# Patient Record
Sex: MALE | Race: BLACK OR AFRICAN AMERICAN | Employment: OTHER | RURAL
[De-identification: names, ages, dates, MRNs, and addresses within clinical notes are randomized per-mention and may not be internally consistent; named-entity substitution may affect disease eponyms.]

---

## 2018-12-03 PROBLEM — Z72.0 TOBACCO USE: Chronic | Status: ACTIVE | Noted: 2018-12-03

## 2018-12-03 PROBLEM — I10 ESSENTIAL HYPERTENSION: Chronic | Status: ACTIVE | Noted: 2018-12-03

## 2018-12-03 PROBLEM — Z91.89 POOR DENTAL HYGIENE: Status: ACTIVE | Noted: 2018-12-03

## 2021-01-01 ENCOUNTER — HOSPITAL ENCOUNTER (INPATIENT)
Age: 71
LOS: 1 days | DRG: 871 | End: 2021-08-31
Attending: FAMILY MEDICINE | Admitting: INTERNAL MEDICINE
Payer: MEDICARE

## 2021-01-01 ENCOUNTER — APPOINTMENT (OUTPATIENT)
Dept: CT IMAGING | Age: 71
End: 2021-01-01
Attending: EMERGENCY MEDICINE
Payer: MEDICARE

## 2021-01-01 ENCOUNTER — APPOINTMENT (OUTPATIENT)
Dept: GENERAL RADIOLOGY | Age: 71
DRG: 871 | End: 2021-01-01
Attending: INTERNAL MEDICINE
Payer: MEDICARE

## 2021-01-01 ENCOUNTER — APPOINTMENT (OUTPATIENT)
Dept: CT IMAGING | Age: 71
DRG: 871 | End: 2021-01-01
Attending: FAMILY MEDICINE
Payer: MEDICARE

## 2021-01-01 ENCOUNTER — APPOINTMENT (OUTPATIENT)
Dept: GENERAL RADIOLOGY | Age: 71
DRG: 871 | End: 2021-01-01
Attending: FAMILY MEDICINE
Payer: MEDICARE

## 2021-01-01 ENCOUNTER — APPOINTMENT (OUTPATIENT)
Dept: GENERAL RADIOLOGY | Age: 71
End: 2021-01-01
Attending: EMERGENCY MEDICINE
Payer: MEDICARE

## 2021-01-01 ENCOUNTER — HOSPITAL ENCOUNTER (EMERGENCY)
Age: 71
Discharge: HOME | End: 2021-08-17
Attending: EMERGENCY MEDICINE
Payer: MEDICARE

## 2021-01-01 VITALS
DIASTOLIC BLOOD PRESSURE: 56 MMHG | RESPIRATION RATE: 20 BRPM | HEART RATE: 59 BPM | TEMPERATURE: 95.2 F | OXYGEN SATURATION: 98 % | WEIGHT: 96.3 LBS | BODY MASS INDEX: 15.11 KG/M2 | SYSTOLIC BLOOD PRESSURE: 113 MMHG | HEIGHT: 67 IN

## 2021-01-01 VITALS
HEIGHT: 68 IN | HEART RATE: 89 BPM | BODY MASS INDEX: 15.91 KG/M2 | WEIGHT: 105 LBS | SYSTOLIC BLOOD PRESSURE: 137 MMHG | RESPIRATION RATE: 16 BRPM | DIASTOLIC BLOOD PRESSURE: 108 MMHG | TEMPERATURE: 97.8 F | OXYGEN SATURATION: 100 %

## 2021-01-01 DIAGNOSIS — D64.9 ANEMIA, UNSPECIFIED TYPE: ICD-10-CM

## 2021-01-01 DIAGNOSIS — R64 CACHEXIA (HCC): ICD-10-CM

## 2021-01-01 DIAGNOSIS — R41.82 ALTERED MENTAL STATUS, UNSPECIFIED ALTERED MENTAL STATUS TYPE: Primary | ICD-10-CM

## 2021-01-01 DIAGNOSIS — R63.4 WEIGHT LOSS: ICD-10-CM

## 2021-01-01 DIAGNOSIS — E87.20 LACTIC ACIDOSIS: ICD-10-CM

## 2021-01-01 DIAGNOSIS — E86.0 DEHYDRATION: ICD-10-CM

## 2021-01-01 DIAGNOSIS — S32.001A CLOSED BURST FRACTURE OF LUMBAR VERTEBRA, INITIAL ENCOUNTER (HCC): Primary | ICD-10-CM

## 2021-01-01 LAB
ALBUMIN SERPL-MCNC: 2.1 G/DL (ref 3.5–5)
ALBUMIN SERPL-MCNC: 2.4 G/DL (ref 3.5–5)
ALBUMIN SERPL-MCNC: 2.6 G/DL (ref 3.5–5)
ALBUMIN SERPL-MCNC: 3.1 G/DL (ref 3.5–5)
ALBUMIN/GLOB SERPL: 0.6 {RATIO} (ref 1.1–2.2)
ALBUMIN/GLOB SERPL: 0.6 {RATIO} (ref 1.1–2.2)
ALBUMIN/GLOB SERPL: 0.7 {RATIO} (ref 1.1–2.2)
ALBUMIN/GLOB SERPL: 0.8 {RATIO} (ref 1.1–2.2)
ALP SERPL-CCNC: 37 U/L (ref 45–117)
ALP SERPL-CCNC: 42 U/L (ref 45–117)
ALP SERPL-CCNC: 43 U/L (ref 45–117)
ALP SERPL-CCNC: 55 U/L (ref 45–117)
ALT SERPL-CCNC: 13 U/L (ref 12–78)
ALT SERPL-CCNC: 13 U/L (ref 12–78)
ALT SERPL-CCNC: 14 U/L (ref 12–78)
ALT SERPL-CCNC: 17 U/L (ref 12–78)
AMMONIA PLAS-SCNC: 13 UMOL/L
AMPHET UR QL SCN: NEGATIVE
ANION GAP SERPL CALC-SCNC: 13 MMOL/L (ref 5–15)
ANION GAP SERPL CALC-SCNC: 17 MMOL/L (ref 5–15)
ANION GAP SERPL CALC-SCNC: 18 MMOL/L (ref 5–15)
ANION GAP SERPL CALC-SCNC: 19 MMOL/L (ref 5–15)
ANION GAP SERPL CALC-SCNC: 19 MMOL/L (ref 5–15)
APPEARANCE UR: CLEAR
ARTERIAL PATENCY WRIST A: YES
AST SERPL-CCNC: 12 U/L (ref 15–37)
AST SERPL-CCNC: 17 U/L (ref 15–37)
ATRIAL RATE: 80 BPM
BACTERIA URNS QL MICRO: NEGATIVE /HPF
BARBITURATES UR QL SCN: NEGATIVE
BASE DEFICIT BLDA-SCNC: 2.9 MMOL/L
BASOPHILS # BLD: 0 K/UL (ref 0–0.1)
BASOPHILS # BLD: 0.1 K/UL (ref 0–0.1)
BASOPHILS NFR BLD: 0 % (ref 0–1)
BASOPHILS NFR BLD: 1 % (ref 0–1)
BDY SITE: ABNORMAL
BENZODIAZ UR QL: NEGATIVE
BILIRUB SERPL-MCNC: 1.2 MG/DL (ref 0.2–1)
BILIRUB SERPL-MCNC: 1.3 MG/DL (ref 0.2–1)
BILIRUB SERPL-MCNC: 1.7 MG/DL (ref 0.2–1)
BILIRUB SERPL-MCNC: 1.8 MG/DL (ref 0.2–1)
BILIRUB UR QL CFM: NEGATIVE
BUN SERPL-MCNC: 20 MG/DL (ref 6–20)
BUN SERPL-MCNC: 38 MG/DL (ref 6–20)
BUN SERPL-MCNC: 40 MG/DL (ref 6–20)
BUN SERPL-MCNC: 42 MG/DL (ref 6–20)
BUN SERPL-MCNC: 44 MG/DL (ref 6–20)
BUN/CREAT SERPL: 19 (ref 12–20)
BUN/CREAT SERPL: 32 (ref 12–20)
BUN/CREAT SERPL: 33 (ref 12–20)
BUN/CREAT SERPL: 35 (ref 12–20)
BUN/CREAT SERPL: 38 (ref 12–20)
CALCIUM SERPL-MCNC: 10.1 MG/DL (ref 8.5–10.1)
CALCIUM SERPL-MCNC: 7.8 MG/DL (ref 8.5–10.1)
CALCIUM SERPL-MCNC: 7.9 MG/DL (ref 8.5–10.1)
CALCIUM SERPL-MCNC: 8.1 MG/DL (ref 8.5–10.1)
CALCIUM SERPL-MCNC: 8.9 MG/DL (ref 8.5–10.1)
CALCULATED P AXIS, ECG09: 68 DEGREES
CALCULATED R AXIS, ECG10: 78 DEGREES
CALCULATED T AXIS, ECG11: 18 DEGREES
CANNABINOIDS UR QL SCN: NEGATIVE
CHLORIDE SERPL-SCNC: 111 MMOL/L (ref 97–108)
CHLORIDE SERPL-SCNC: 116 MMOL/L (ref 97–108)
CHLORIDE SERPL-SCNC: 118 MMOL/L (ref 97–108)
CHLORIDE SERPL-SCNC: 119 MMOL/L (ref 97–108)
CHLORIDE SERPL-SCNC: 98 MMOL/L (ref 97–108)
CK SERPL-CCNC: 81 U/L (ref 39–308)
CO2 SERPL-SCNC: 15 MMOL/L (ref 21–32)
CO2 SERPL-SCNC: 16 MMOL/L (ref 21–32)
CO2 SERPL-SCNC: 17 MMOL/L (ref 21–32)
CO2 SERPL-SCNC: 17 MMOL/L (ref 21–32)
CO2 SERPL-SCNC: 24 MMOL/L (ref 21–32)
COCAINE UR QL SCN: NEGATIVE
COLOR UR: ABNORMAL
CREAT SERPL-MCNC: 1.07 MG/DL (ref 0.7–1.3)
CREAT SERPL-MCNC: 1.15 MG/DL (ref 0.7–1.3)
CREAT SERPL-MCNC: 1.3 MG/DL (ref 0.7–1.3)
DIAGNOSIS, 93000: NORMAL
DIFFERENTIAL METHOD BLD: ABNORMAL
DRUG SCRN COMMENT,DRGCM: NORMAL
EOSINOPHIL # BLD: 0 K/UL (ref 0–0.4)
EOSINOPHIL # BLD: 0.1 K/UL (ref 0–0.4)
EOSINOPHIL NFR BLD: 0 % (ref 0–7)
EOSINOPHIL NFR BLD: 1 % (ref 0–7)
EPITH CASTS URNS QL MICRO: ABNORMAL /LPF
ERYTHROCYTE [DISTWIDTH] IN BLOOD BY AUTOMATED COUNT: 13.7 % (ref 11.5–14.5)
ERYTHROCYTE [DISTWIDTH] IN BLOOD BY AUTOMATED COUNT: 14.2 % (ref 11.5–14.5)
ERYTHROCYTE [DISTWIDTH] IN BLOOD BY AUTOMATED COUNT: 14.4 % (ref 11.5–14.5)
ERYTHROCYTE [DISTWIDTH] IN BLOOD BY AUTOMATED COUNT: 14.6 % (ref 11.5–14.5)
ETHANOL SERPL-MCNC: <10 MG/DL
ETHANOL SERPL-MCNC: <10 MG/DL
FLUAV RNA SPEC QL NAA+PROBE: NOT DETECTED
FLUBV RNA SPEC QL NAA+PROBE: NOT DETECTED
GLOBULIN SER CALC-MCNC: 3.2 G/DL (ref 2–4)
GLOBULIN SER CALC-MCNC: 3.4 G/DL (ref 2–4)
GLOBULIN SER CALC-MCNC: 4 G/DL (ref 2–4)
GLOBULIN SER CALC-MCNC: 4.9 G/DL (ref 2–4)
GLUCOSE SERPL-MCNC: 104 MG/DL (ref 65–100)
GLUCOSE SERPL-MCNC: 117 MG/DL (ref 65–100)
GLUCOSE SERPL-MCNC: 88 MG/DL (ref 65–100)
GLUCOSE SERPL-MCNC: 99 MG/DL (ref 65–100)
GLUCOSE SERPL-MCNC: 99 MG/DL (ref 65–100)
GLUCOSE UR STRIP.AUTO-MCNC: NEGATIVE MG/DL
HCO3 BLDA-SCNC: 19 MMOL/L (ref 22–26)
HCT VFR BLD AUTO: 29.6 % (ref 36.6–50.3)
HCT VFR BLD AUTO: 32.1 % (ref 36.6–50.3)
HCT VFR BLD AUTO: 32.4 % (ref 36.6–50.3)
HCT VFR BLD AUTO: 48.2 % (ref 36.6–50.3)
HEMOCCULT STL QL: NEGATIVE
HGB BLD-MCNC: 10.5 G/DL (ref 12.1–17)
HGB BLD-MCNC: 10.8 G/DL (ref 12.1–17)
HGB BLD-MCNC: 15.8 G/DL (ref 12.1–17)
HGB BLD-MCNC: 9.1 G/DL (ref 12.1–17)
HGB BLD-MCNC: 9.6 G/DL (ref 12.1–17)
HGB UR QL STRIP: ABNORMAL
IMM GRANULOCYTES # BLD AUTO: 0 K/UL (ref 0–0.04)
IMM GRANULOCYTES # BLD AUTO: 0.1 K/UL (ref 0–0.04)
IMM GRANULOCYTES NFR BLD AUTO: 1 % (ref 0–0.5)
IMM GRANULOCYTES NFR BLD AUTO: 2 % (ref 0–0.5)
KETONES UR QL STRIP.AUTO: ABNORMAL MG/DL
LACTATE SERPL-SCNC: 2.2 MMOL/L (ref 0.4–2)
LACTATE SERPL-SCNC: 2.5 MMOL/L (ref 0.4–2)
LACTATE SERPL-SCNC: 3 MMOL/L (ref 0.4–2)
LACTATE SERPL-SCNC: 3.4 MMOL/L (ref 0.4–2)
LACTATE SERPL-SCNC: 4.6 MMOL/L (ref 0.4–2)
LACTATE SERPL-SCNC: 5.2 MMOL/L (ref 0.4–2)
LEUKOCYTE ESTERASE UR QL STRIP.AUTO: ABNORMAL
LYMPHOCYTES # BLD: 1.1 K/UL (ref 0.8–3.5)
LYMPHOCYTES # BLD: 1.2 K/UL (ref 0.8–3.5)
LYMPHOCYTES # BLD: 1.3 K/UL (ref 0.8–3.5)
LYMPHOCYTES # BLD: 2.3 K/UL (ref 0.8–3.5)
LYMPHOCYTES NFR BLD: 15 % (ref 12–49)
LYMPHOCYTES NFR BLD: 16 % (ref 12–49)
LYMPHOCYTES NFR BLD: 19 % (ref 12–49)
LYMPHOCYTES NFR BLD: 29 % (ref 12–49)
MAGNESIUM SERPL-MCNC: 1.8 MG/DL (ref 1.6–2.4)
MAGNESIUM SERPL-MCNC: 2.1 MG/DL (ref 1.6–2.4)
MCH RBC QN AUTO: 29.3 PG (ref 26–34)
MCH RBC QN AUTO: 29.4 PG (ref 26–34)
MCH RBC QN AUTO: 29.6 PG (ref 26–34)
MCH RBC QN AUTO: 29.9 PG (ref 26–34)
MCHC RBC AUTO-ENTMCNC: 32.4 G/DL (ref 30–36.5)
MCHC RBC AUTO-ENTMCNC: 32.7 G/DL (ref 30–36.5)
MCHC RBC AUTO-ENTMCNC: 32.8 G/DL (ref 30–36.5)
MCHC RBC AUTO-ENTMCNC: 33.3 G/DL (ref 30–36.5)
MCV RBC AUTO: 88.8 FL (ref 80–99)
MCV RBC AUTO: 89.7 FL (ref 80–99)
MCV RBC AUTO: 90.5 FL (ref 80–99)
MCV RBC AUTO: 91.1 FL (ref 80–99)
METHADONE UR QL: NEGATIVE
MONOCYTES # BLD: 0.3 K/UL (ref 0–1)
MONOCYTES # BLD: 0.3 K/UL (ref 0–1)
MONOCYTES # BLD: 0.4 K/UL (ref 0–1)
MONOCYTES # BLD: 0.5 K/UL (ref 0–1)
MONOCYTES NFR BLD: 4 % (ref 5–13)
MONOCYTES NFR BLD: 5 % (ref 5–13)
MONOCYTES NFR BLD: 5 % (ref 5–13)
MONOCYTES NFR BLD: 6 % (ref 5–13)
NEUTS SEG # BLD: 4.9 K/UL (ref 1.8–8)
NEUTS SEG # BLD: 5 K/UL (ref 1.8–8)
NEUTS SEG # BLD: 5.8 K/UL (ref 1.8–8)
NEUTS SEG # BLD: 6 K/UL (ref 1.8–8)
NEUTS SEG NFR BLD: 62 % (ref 32–75)
NEUTS SEG NFR BLD: 75 % (ref 32–75)
NEUTS SEG NFR BLD: 78 % (ref 32–75)
NEUTS SEG NFR BLD: 79 % (ref 32–75)
NITRITE UR QL STRIP.AUTO: NEGATIVE
NRBC # BLD: 0 K/UL (ref 0–0.01)
NRBC # BLD: 0.11 K/UL (ref 0–0.01)
NRBC # BLD: 0.15 K/UL (ref 0–0.01)
NRBC # BLD: 0.18 K/UL (ref 0–0.01)
NRBC BLD-RTO: 0 PER 100 WBC
NRBC BLD-RTO: 1.6 PER 100 WBC
NRBC BLD-RTO: 2.1 PER 100 WBC
NRBC BLD-RTO: 2.3 PER 100 WBC
OPIATES UR QL: NEGATIVE
P-R INTERVAL, ECG05: 112 MS
PCO2 BLDA: 27 MMHG (ref 35–45)
PCP UR QL: NEGATIVE
PH BLDA: 7.47 [PH] (ref 7.35–7.45)
PH UR STRIP: 5.5 [PH] (ref 5–8)
PLATELET # BLD AUTO: 104 K/UL (ref 150–400)
PLATELET # BLD AUTO: 139 K/UL (ref 150–400)
PLATELET # BLD AUTO: 162 K/UL (ref 150–400)
PLATELET # BLD AUTO: 196 K/UL (ref 150–400)
PMV BLD AUTO: 10.4 FL (ref 8.9–12.9)
PMV BLD AUTO: 10.6 FL (ref 8.9–12.9)
PMV BLD AUTO: 10.7 FL (ref 8.9–12.9)
PMV BLD AUTO: 10.9 FL (ref 8.9–12.9)
PO2 BLDA: 129 MMHG (ref 80–100)
POTASSIUM SERPL-SCNC: 3.1 MMOL/L (ref 3.5–5.1)
POTASSIUM SERPL-SCNC: 3.4 MMOL/L (ref 3.5–5.1)
POTASSIUM SERPL-SCNC: 3.9 MMOL/L (ref 3.5–5.1)
POTASSIUM SERPL-SCNC: 4.1 MMOL/L (ref 3.5–5.1)
POTASSIUM SERPL-SCNC: 4.1 MMOL/L (ref 3.5–5.1)
PROT SERPL-MCNC: 5.5 G/DL (ref 6.4–8.2)
PROT SERPL-MCNC: 5.6 G/DL (ref 6.4–8.2)
PROT SERPL-MCNC: 6.6 G/DL (ref 6.4–8.2)
PROT SERPL-MCNC: 8 G/DL (ref 6.4–8.2)
PROT UR STRIP-MCNC: NEGATIVE MG/DL
Q-T INTERVAL, ECG07: 432 MS
QRS DURATION, ECG06: 84 MS
QTC CALCULATION (BEZET), ECG08: 498 MS
RBC # BLD AUTO: 3.27 M/UL (ref 4.1–5.7)
RBC # BLD AUTO: 3.58 M/UL (ref 4.1–5.7)
RBC # BLD AUTO: 3.65 M/UL (ref 4.1–5.7)
RBC # BLD AUTO: 5.29 M/UL (ref 4.1–5.7)
RBC #/AREA URNS HPF: ABNORMAL /HPF (ref 0–5)
SAO2 % BLD: 99 % (ref 92–97)
SAO2% DEVICE SAO2% SENSOR NAME: ABNORMAL
SARS-COV-2, COV2: NOT DETECTED
SODIUM SERPL-SCNC: 135 MMOL/L (ref 136–145)
SODIUM SERPL-SCNC: 147 MMOL/L (ref 136–145)
SODIUM SERPL-SCNC: 151 MMOL/L (ref 136–145)
SODIUM SERPL-SCNC: 152 MMOL/L (ref 136–145)
SODIUM SERPL-SCNC: 152 MMOL/L (ref 136–145)
SP GR UR REFRACTOMETRY: 1.02 (ref 1–1.03)
SPECIMEN SITE: ABNORMAL
TROPONIN I SERPL-MCNC: <0.05 NG/ML
TSH SERPL DL<=0.05 MIU/L-ACNC: 1.56 UIU/ML (ref 0.36–3.74)
UA: UC IF INDICATED,UAUC: ABNORMAL
UROBILINOGEN UR QL STRIP.AUTO: 1 EU/DL (ref 0.2–1)
VANCOMYCIN SERPL-MCNC: 19 UG/ML
VENTRICULAR RATE, ECG03: 80 BPM
VIT B12 SERPL-MCNC: 600 PG/ML (ref 193–986)
WBC # BLD AUTO: 6.7 K/UL (ref 4.1–11.1)
WBC # BLD AUTO: 7.3 K/UL (ref 4.1–11.1)
WBC # BLD AUTO: 7.7 K/UL (ref 4.1–11.1)
WBC # BLD AUTO: 7.9 K/UL (ref 4.1–11.1)
WBC URNS QL MICRO: ABNORMAL /HPF (ref 0–4)

## 2021-01-01 PROCEDURE — 74011250636 HC RX REV CODE- 250/636: Performed by: INTERNAL MEDICINE

## 2021-01-01 PROCEDURE — 85025 COMPLETE CBC W/AUTO DIFF WBC: CPT

## 2021-01-01 PROCEDURE — 85018 HEMOGLOBIN: CPT

## 2021-01-01 PROCEDURE — 74011000250 HC RX REV CODE- 250: Performed by: INTERNAL MEDICINE

## 2021-01-01 PROCEDURE — 80053 COMPREHEN METABOLIC PANEL: CPT

## 2021-01-01 PROCEDURE — 74011000258 HC RX REV CODE- 258: Performed by: FAMILY MEDICINE

## 2021-01-01 PROCEDURE — 96360 HYDRATION IV INFUSION INIT: CPT

## 2021-01-01 PROCEDURE — 99283 EMERGENCY DEPT VISIT LOW MDM: CPT

## 2021-01-01 PROCEDURE — 82077 ASSAY SPEC XCP UR&BREATH IA: CPT

## 2021-01-01 PROCEDURE — 82803 BLOOD GASES ANY COMBINATION: CPT

## 2021-01-01 PROCEDURE — 36415 COLL VENOUS BLD VENIPUNCTURE: CPT

## 2021-01-01 PROCEDURE — 80307 DRUG TEST PRSMV CHEM ANLYZR: CPT

## 2021-01-01 PROCEDURE — 96365 THER/PROPH/DIAG IV INF INIT: CPT

## 2021-01-01 PROCEDURE — 82140 ASSAY OF AMMONIA: CPT

## 2021-01-01 PROCEDURE — 93005 ELECTROCARDIOGRAM TRACING: CPT

## 2021-01-01 PROCEDURE — 99218 HC RM OBSERVATION: CPT

## 2021-01-01 PROCEDURE — 99284 EMERGENCY DEPT VISIT MOD MDM: CPT

## 2021-01-01 PROCEDURE — C9113 INJ PANTOPRAZOLE SODIUM, VIA: HCPCS | Performed by: INTERNAL MEDICINE

## 2021-01-01 PROCEDURE — 70450 CT HEAD/BRAIN W/O DYE: CPT

## 2021-01-01 PROCEDURE — 96376 TX/PRO/DX INJ SAME DRUG ADON: CPT

## 2021-01-01 PROCEDURE — 74011000258 HC RX REV CODE- 258: Performed by: INTERNAL MEDICINE

## 2021-01-01 PROCEDURE — 71045 X-RAY EXAM CHEST 1 VIEW: CPT

## 2021-01-01 PROCEDURE — 65270000029 HC RM PRIVATE

## 2021-01-01 PROCEDURE — 94760 N-INVAS EAR/PLS OXIMETRY 1: CPT

## 2021-01-01 PROCEDURE — 84484 ASSAY OF TROPONIN QUANT: CPT

## 2021-01-01 PROCEDURE — 87040 BLOOD CULTURE FOR BACTERIA: CPT

## 2021-01-01 PROCEDURE — 83605 ASSAY OF LACTIC ACID: CPT

## 2021-01-01 PROCEDURE — 73502 X-RAY EXAM HIP UNI 2-3 VIEWS: CPT

## 2021-01-01 PROCEDURE — 36600 WITHDRAWAL OF ARTERIAL BLOOD: CPT

## 2021-01-01 PROCEDURE — 74011250636 HC RX REV CODE- 250/636: Performed by: FAMILY MEDICINE

## 2021-01-01 PROCEDURE — 51702 INSERT TEMP BLADDER CATH: CPT

## 2021-01-01 PROCEDURE — 74176 CT ABD & PELVIS W/O CONTRAST: CPT

## 2021-01-01 PROCEDURE — 74011250637 HC RX REV CODE- 250/637: Performed by: FAMILY MEDICINE

## 2021-01-01 PROCEDURE — 71046 X-RAY EXAM CHEST 2 VIEWS: CPT

## 2021-01-01 PROCEDURE — 82550 ASSAY OF CK (CPK): CPT

## 2021-01-01 PROCEDURE — 83735 ASSAY OF MAGNESIUM: CPT

## 2021-01-01 PROCEDURE — 71250 CT THORAX DX C-: CPT

## 2021-01-01 PROCEDURE — 82272 OCCULT BLD FECES 1-3 TESTS: CPT

## 2021-01-01 PROCEDURE — 80202 ASSAY OF VANCOMYCIN: CPT

## 2021-01-01 PROCEDURE — 84443 ASSAY THYROID STIM HORMONE: CPT

## 2021-01-01 PROCEDURE — 81001 URINALYSIS AUTO W/SCOPE: CPT

## 2021-01-01 PROCEDURE — 87636 SARSCOV2 & INF A&B AMP PRB: CPT

## 2021-01-01 PROCEDURE — 82607 VITAMIN B-12: CPT

## 2021-01-01 PROCEDURE — 96375 TX/PRO/DX INJ NEW DRUG ADDON: CPT

## 2021-01-01 RX ORDER — ACETAMINOPHEN 650 MG/1
650 SUPPOSITORY RECTAL
Status: DISCONTINUED | OUTPATIENT
Start: 2021-01-01 | End: 2021-01-01 | Stop reason: HOSPADM

## 2021-01-01 RX ORDER — ACETAMINOPHEN 500 MG
500 TABLET ORAL
Qty: 30 TABLET | Refills: 4 | Status: SHIPPED | OUTPATIENT
Start: 2021-01-01

## 2021-01-01 RX ORDER — POTASSIUM CHLORIDE AND SODIUM CHLORIDE 450; 150 MG/100ML; MG/100ML
INJECTION, SOLUTION INTRAVENOUS CONTINUOUS
Status: DISCONTINUED | OUTPATIENT
Start: 2021-01-01 | End: 2021-01-01

## 2021-01-01 RX ORDER — SODIUM CHLORIDE 0.9 % (FLUSH) 0.9 %
5-10 SYRINGE (ML) INJECTION AS NEEDED
Status: DISCONTINUED | OUTPATIENT
Start: 2021-01-01 | End: 2021-01-01 | Stop reason: HOSPADM

## 2021-01-01 RX ORDER — SODIUM CHLORIDE 9 MG/ML
100 INJECTION, SOLUTION INTRAVENOUS CONTINUOUS
Status: DISCONTINUED | OUTPATIENT
Start: 2021-01-01 | End: 2021-01-01

## 2021-01-01 RX ORDER — LEVOFLOXACIN 5 MG/ML
750 INJECTION, SOLUTION INTRAVENOUS
Status: DISCONTINUED | OUTPATIENT
Start: 2021-01-01 | End: 2021-01-01 | Stop reason: HOSPADM

## 2021-01-01 RX ORDER — DEXTROSE MONOHYDRATE 50 MG/ML
100 INJECTION, SOLUTION INTRAVENOUS CONTINUOUS
Status: DISCONTINUED | OUTPATIENT
Start: 2021-01-01 | End: 2021-01-01

## 2021-01-01 RX ORDER — SODIUM CHLORIDE AND POTASSIUM CHLORIDE .9; .15 G/100ML; G/100ML
SOLUTION INTRAVENOUS CONTINUOUS
Status: DISCONTINUED | OUTPATIENT
Start: 2021-01-01 | End: 2021-01-01

## 2021-01-01 RX ADMIN — SODIUM CHLORIDE 100 ML/HR: 9 INJECTION, SOLUTION INTRAVENOUS at 07:00

## 2021-01-01 RX ADMIN — SODIUM CHLORIDE 1000 ML: 9 INJECTION, SOLUTION INTRAVENOUS at 04:44

## 2021-01-01 RX ADMIN — POTASSIUM CHLORIDE AND SODIUM CHLORIDE: 900; 150 INJECTION, SOLUTION INTRAVENOUS at 10:36

## 2021-01-01 RX ADMIN — VANCOMYCIN HYDROCHLORIDE 500 MG: 500 INJECTION, POWDER, LYOPHILIZED, FOR SOLUTION INTRAVENOUS at 09:51

## 2021-01-01 RX ADMIN — CEFTRIAXONE SODIUM 1 G: 1 INJECTION, POWDER, FOR SOLUTION INTRAMUSCULAR; INTRAVENOUS at 00:55

## 2021-01-01 RX ADMIN — CEFEPIME HYDROCHLORIDE 2 G: 2 INJECTION, POWDER, FOR SOLUTION INTRAVENOUS at 09:14

## 2021-01-01 RX ADMIN — LEVOFLOXACIN 750 MG: 5 INJECTION, SOLUTION INTRAVENOUS at 10:31

## 2021-01-01 RX ADMIN — THIAMINE HYDROCHLORIDE 100 MG: 100 INJECTION, SOLUTION INTRAMUSCULAR; INTRAVENOUS at 14:39

## 2021-01-01 RX ADMIN — ACETAMINOPHEN 650 MG: 650 SUPPOSITORY RECTAL at 03:27

## 2021-01-01 RX ADMIN — PANTOPRAZOLE SODIUM 40 MG: 40 INJECTION, POWDER, FOR SOLUTION INTRAVENOUS at 09:10

## 2021-01-01 RX ADMIN — SODIUM CHLORIDE 1000 ML: 9 INJECTION, SOLUTION INTRAVENOUS at 06:15

## 2021-01-01 RX ADMIN — POTASSIUM CHLORIDE AND SODIUM CHLORIDE: 450; 150 INJECTION, SOLUTION INTRAVENOUS at 20:46

## 2021-01-01 RX ADMIN — SODIUM CHLORIDE 100 ML/HR: 9 INJECTION, SOLUTION INTRAVENOUS at 00:20

## 2021-01-01 RX ADMIN — CEFEPIME HYDROCHLORIDE 2 G: 2 INJECTION, POWDER, FOR SOLUTION INTRAVENOUS at 20:46

## 2021-01-01 RX ADMIN — SODIUM CHLORIDE 1000 ML: 9 INJECTION, SOLUTION INTRAVENOUS at 01:34

## 2021-01-01 RX ADMIN — CEFEPIME HYDROCHLORIDE 2 G: 2 INJECTION, POWDER, FOR SOLUTION INTRAVENOUS at 09:54

## 2021-01-01 RX ADMIN — PANTOPRAZOLE SODIUM 40 MG: 40 INJECTION, POWDER, FOR SOLUTION INTRAVENOUS at 14:46

## 2021-01-01 RX ADMIN — VANCOMYCIN HYDROCHLORIDE 1000 MG: 1 INJECTION, POWDER, LYOPHILIZED, FOR SOLUTION INTRAVENOUS at 14:38

## 2021-01-01 RX ADMIN — PANTOPRAZOLE SODIUM 40 MG: 40 INJECTION, POWDER, FOR SOLUTION INTRAVENOUS at 10:36

## 2021-01-01 RX ADMIN — ACETAMINOPHEN 650 MG: 650 SUPPOSITORY RECTAL at 11:00

## 2021-01-01 RX ADMIN — VANCOMYCIN HYDROCHLORIDE 500 MG: 500 INJECTION, POWDER, LYOPHILIZED, FOR SOLUTION INTRAVENOUS at 23:57

## 2021-01-01 RX ADMIN — SODIUM BICARBONATE: 84 INJECTION, SOLUTION INTRAVENOUS at 13:20

## 2021-01-01 RX ADMIN — SODIUM CHLORIDE 1000 ML: 9 INJECTION, SOLUTION INTRAVENOUS at 19:25

## 2021-08-17 NOTE — ED PROVIDER NOTES
EMERGENCY DEPARTMENT HISTORY AND PHYSICAL EXAM          Date: 8/17/2021  Patient Name: Jose Scott    History of Presenting Illness     Chief Complaint   Patient presents with    Flank Pain    Weight Loss       History Provided By: Patient    HPI: Jose Scott is a 80 y.o. male, pmhx listed below, who presents to the ED c/o weight loss and back pain. Patient reports he has been experiencing right flank and low back pain for the last 2 to 3 weeks. Reports he has also been losing weight although unsure the exact amount and over what duration. Daughter reports that she has a photo of the patient from February and he looks dramatically different today. Patient reports he had a fall walking to the bathroom at night several weeks ago, unsure of the exact date. He denies other injuries. Denies weakness or numbness in lower extremities. No leg pain. Reports he is a half a pack per day smoker for the last 50 years and drinks 3-4 beers per day. Reports that he has been spending most days in bed especially over the last week due to back pain. Daughter reports family members and friends bring the patient food, the patient also shops for food himself, but has not been eating. Patient has not seen a doctor since 2018. Daughter called this morning to try to be seen by primary care but was unable to get an appointment until October. PCP: Kyle Wright NP    There are no other complaints, changes, or physical findings at this time. Past History       Past Medical History:  Past Medical History:   Diagnosis Date    Hypertension     non compliant with meds    Hypertension        Past Surgical History:  No past surgical history on file.     Family History:  Family History   Problem Relation Age of Onset    Diabetes Mother     Hypertension Mother        Social History:  Social History     Tobacco Use    Smoking status: Current Every Day Smoker     Packs/day: 0.50     Years: 54.00     Pack years: 27.00     Types: Cigarettes    Smokeless tobacco: Never Used   Substance Use Topics    Alcohol use: Yes    Drug use: No       Current Facility-Administered Medications   Medication Dose Route Frequency Provider Last Rate Last Admin    sodium chloride 0.9 % bolus infusion 1,000 mL  1,000 mL IntraVENous ONCE Mayo Espana MD         Current Outpatient Medications   Medication Sig Dispense Refill    acetaminophen (TYLENOL) 500 mg tablet Take 1 Tablet by mouth every six (6) hours as needed for Pain. 30 Tablet 4    aspirin delayed-release 81 mg tablet Take 1 Tab by mouth daily. 90 Tab 1    NIFEdipine ER (PROCARDIA XL) 30 mg ER tablet Take 1 Tab by mouth daily. 90 Tab 1    hydroCHLOROthiazide (HYDRODIURIL) 25 mg tablet Take 1 Tab by mouth daily. 90 Tab 1       Allergies:  No Known Allergies      Review of Systems   Review of Systems   Constitutional: Positive for unexpected weight change. Negative for chills and fever. HENT: Negative for ear pain. Eyes: Negative for pain. Respiratory: Negative for shortness of breath. Cardiovascular: Negative for chest pain. Gastrointestinal: Negative for abdominal pain. Genitourinary: Negative for flank pain. Musculoskeletal: Positive for back pain. Skin: Negative for rash. Neurological: Negative for headaches. Psychiatric/Behavioral: Negative for agitation. Physical Exam     Vital Signs-Reviewed the patient's vital signs. Patient Vitals for the past 12 hrs:   Temp Pulse Resp BP SpO2   08/17/21 1102  89 16 (!) 137/108 100 %   08/17/21 1056  99 18 (!) 137/98 97 %   08/17/21 1039 97.8 °F (36.6 °C) (!) 114 20 (!) 147/80 98 %       Physical Exam  Vitals reviewed. Constitutional:       General: He is not in acute distress. Comments: Cachectic   HENT:      Head: Normocephalic and atraumatic. Mouth/Throat:      Mouth: Mucous membranes are moist.   Cardiovascular:      Rate and Rhythm: Normal rate and regular rhythm.    Pulmonary: Effort: Pulmonary effort is normal.      Breath sounds: Normal breath sounds. Abdominal:      Palpations: Abdomen is soft. Tenderness: There is no abdominal tenderness. Musculoskeletal:         General: Normal range of motion. Cervical back: Normal range of motion. Comments: No midline spinal tenderness. No skin changes. Normal DP pulses, bilaterally. Skin:     General: Skin is warm and dry. Neurological:      Mental Status: He is alert and oriented to person, place, and time. Sensory: No sensory deficit. Motor: No weakness. Psychiatric:         Mood and Affect: Mood normal.         Diagnostic Study Results     Labs -     Recent Results (from the past 12 hour(s))   CBC WITH AUTOMATED DIFF    Collection Time: 08/17/21 11:02 AM   Result Value Ref Range    WBC 7.9 4.1 - 11.1 K/uL    RBC 5.29 4. 10 - 5.70 M/uL    HGB 15.8 12.1 - 17.0 g/dL    HCT 48.2 36.6 - 50.3 %    MCV 91.1 80.0 - 99.0 FL    MCH 29.9 26.0 - 34.0 PG    MCHC 32.8 30.0 - 36.5 g/dL    RDW 13.7 11.5 - 14.5 %    PLATELET 261 656 - 862 K/uL    MPV 10.4 8.9 - 12.9 FL    NRBC 0.0 0  WBC    ABSOLUTE NRBC 0.00 0.00 - 0.01 K/uL    NEUTROPHILS 62 32 - 75 %    LYMPHOCYTES 29 12 - 49 %    MONOCYTES 6 5 - 13 %    EOSINOPHILS 1 0 - 7 %    BASOPHILS 1 0 - 1 %    IMMATURE GRANULOCYTES 1 (H) 0.0 - 0.5 %    ABS. NEUTROPHILS 4.9 1.8 - 8.0 K/UL    ABS. LYMPHOCYTES 2.3 0.8 - 3.5 K/UL    ABS. MONOCYTES 0.5 0.0 - 1.0 K/UL    ABS. EOSINOPHILS 0.1 0.0 - 0.4 K/UL    ABS. BASOPHILS 0.1 0.0 - 0.1 K/UL    ABS. IMM.  GRANS. 0.0 0.00 - 0.04 K/UL    DF AUTOMATED     METABOLIC PANEL, COMPREHENSIVE    Collection Time: 08/17/21 11:02 AM   Result Value Ref Range    Sodium 135 (L) 136 - 145 mmol/L    Potassium 4.1 3.5 - 5.1 mmol/L    Chloride 98 97 - 108 mmol/L    CO2 24 21 - 32 mmol/L    Anion gap 13 5 - 15 mmol/L    Glucose 99 65 - 100 mg/dL    BUN 20 6 - 20 MG/DL    Creatinine 1.07 0.70 - 1.30 MG/DL    BUN/Creatinine ratio 19 12 - 20      GFR est AA >60 >60 ml/min/1.73m2    GFR est non-AA >60 >60 ml/min/1.73m2    Calcium 10.1 8.5 - 10.1 MG/DL    Bilirubin, total 1.8 (H) 0.2 - 1.0 MG/DL    ALT (SGPT) 17 12 - 78 U/L    AST (SGOT) 17 15 - 37 U/L    Alk. phosphatase 55 45 - 117 U/L    Protein, total 8.0 6.4 - 8.2 g/dL    Albumin 3.1 (L) 3.5 - 5.0 g/dL    Globulin 4.9 (H) 2.0 - 4.0 g/dL    A-G Ratio 0.6 (L) 1.1 - 2.2     MAGNESIUM    Collection Time: 08/17/21 11:02 AM   Result Value Ref Range    Magnesium 1.8 1.6 - 2.4 mg/dL   ETHYL ALCOHOL    Collection Time: 08/17/21 11:02 AM   Result Value Ref Range    ALCOHOL(ETHYL),SERUM <10 <10 MG/DL       Radiologic Studies -   XR CHEST PA LAT   Final Result   1. No acute cardiopulmonary disease         CT ABD PELV WO CONT   Final Result      1. Acute L2 burst fracture   2. Severe atherosclerotic vascular change. Possible occlusion of the right   common iliac artery      Findings discussed with Dr. Constance Wagner        CT Results  (Last 48 hours)               08/17/21 1109  CT ABD PELV WO CONT Final result    Impression:      1. Acute L2 burst fracture   2. Severe atherosclerotic vascular change. Possible occlusion of the right   common iliac artery       Findings discussed with Dr. Constance Wagner       Narrative:  EXAM: CT ABD PELV WO CONT       INDICATION: R flank pain       COMPARISON:       CONTRAST:  None. TECHNIQUE:    Thin axial images were obtained through the abdomen and pelvis. Coronal and   sagittal reformats were generated. Oral contrast was not administered. CT dose   reduction was achieved through use of a standardized protocol tailored for this   examination and automatic exposure control for dose modulation. The absence of intravenous contrast material reduces the sensitivity for   evaluation of the vasculature and solid organs. FINDINGS:    LOWER THORAX: No significant abnormality in the incidentally imaged lower chest.   LIVER: No mass. BILIARY TREE: Gallbladder is within normal limits.  CBD is not dilated. SPLEEN: within normal limits. PANCREAS: No focal abnormality. ADRENALS: Unremarkable. KIDNEYS/URETERS: No stone or hydronephrosis. Round hypodensity left kidney may   represent a cyst but is incompletely evaluated   STOMACH: Unremarkable. SMALL BOWEL: No dilatation or wall thickening. COLON: No dilatation or wall thickening. APPENDIX: Normal   PERITONEUM: No ascites or pneumoperitoneum. RETROPERITONEUM: Stents of atherosclerotic vascular calcification with calcified   plaque occluding or nearly occluding the right common Iliac artery. REPRODUCTIVE ORGANS: Not enlarged   URINARY BLADDER: No mass or calculus. BONES: Levoconvex scoliosis. Acute-appearing burst fracture of L2 is subtle   retropulsion. Collapsed the inferior endplate of L3 appears chronic   ABDOMINAL WALL: No mass or hernia. ADDITIONAL COMMENTS: N/A               CXR Results  (Last 48 hours)               08/17/21 1120  XR CHEST PA LAT Final result    Impression:  1. No acute cardiopulmonary disease           Narrative:  INDICATION:  weakness, weight loss        Exam: Chest 2 views. Comparison: 12/12/2019. Findings: Cardiomediastinal silhouette is normal. Pulmonary vasculature is not   engorged. No focal parenchymal opacities, effusions, or pneumothorax. Fractures   of the left fifth and sixth ribs are new in the interval but appear chronic in   appearance. Medical Decision Making   I am the first provider for this patient. I reviewed the vital signs, available nursing notes, past medical history, past surgical history, family history and social history. Records Reviewed: Nursing Notes and Old Medical Records    Provider Notes (Medical Decision Making):   MDM: 81-year-old male who appears frail and cachectic, has not seen a doctor in at least 3 years, poorly controlled blood pressure, long-term smoker, regular alcohol drinker.   History of recent weight loss is concerning for cancer. Also consider acute electrolyte abnormality, anemia, kidney failure, chronic alcoholism. We will plan for basic lab work and CT of abdomen for possible mass/malignancy causing flank pain. Initial assessment performed. The patients presenting problems have been discussed, and they are in agreement with the care plan formulated and outlined with them. I have encouraged them to ask questions as they arise throughout their visit. PROGRESS NOTE:    CT reveals burst L2 fracture. Patient has a normal neurological exam and it is likely injury happened a few weeks ago. This pain is likely contributing to the patient not eating. CT does not reveal mass/malignancy. Lab work also unremarkable. CT also shows occlusion of right common iliac. Distal pulses symmetric and easily palpable. With daughter present, we discussed signs of arterial occlusion and reasons to return to the emergency department emergently. We discussed some strategies for eating regularly and increasing caloric intake. We will recommend PCP follow-up for further evaluation. Patient and daughter voiced understanding. Discharge note:   Updated pt on all final results. Will follow up as instructed. All questions have been answered, pt voiced understanding and agreement with plan. Specific return precautions provided as well as instructions to return to the ED should sx worsen at any time. Vital signs stable for discharge. Diagnosis     Clinical Impression:   1. Closed burst fracture of lumbar vertebra, initial encounter ([facility] [address]    2.  Weight loss            Disposition:  Discharged    Discharge Medication List as of 8/17/2021 12:18 PM      START taking these medications    Details   acetaminophen (TYLENOL) 500 mg tablet Take 1 Tablet by mouth every six (6) hours as needed for Pain., Normal, Disp-30 Tablet, R-4         CONTINUE these medications which have NOT CHANGED    Details   aspirin delayed-release 81 mg tablet Take 1 Tab by mouth daily. , Normal, Disp-90 Tab, R-1      NIFEdipine ER (PROCARDIA XL) 30 mg ER tablet Take 1 Tab by mouth daily. , Normal, Disp-90 Tab, R-1      hydroCHLOROthiazide (HYDRODIURIL) 25 mg tablet Take 1 Tab by mouth daily. , Normal, Disp-90 Tab, R-1               Please note, this dictation was completed with [employer], the computer voice recognition software. Quite often unanticipated grammatical, syntax, homophones, and other interpretive errors are inadvertently transcribed by the computer software. Please disregard these errors. Please excuse any errors that have escaped final proof reading.

## 2021-08-17 NOTE — ED NOTES
Discharge instructions are reviewed with patient who denies questions or concerns. Leaves via wheelchair to care of daughter.

## 2021-08-29 PROBLEM — R41.82 ALTERED MENTAL STATUS: Status: ACTIVE | Noted: 2021-01-01

## 2021-08-29 NOTE — Clinical Note
Patient Class[de-identified] OBSERVATION [104]  Type of Bed: Remote Telemetry [29]  Cardiac Monitoring Required?: Yes  Reason for Observation: anemia, dehydration, altered mental status  Admitting Diagnosis: Altered mental status [780.97. ICD-9-CM]  Admitting Phys Teri De La Paz [[id number]]  Attending Physician: Ho Mendoza [[id number]]

## 2021-08-30 PROBLEM — F10.10 ETOH ABUSE: Chronic | Status: ACTIVE | Noted: 2021-01-01

## 2021-08-30 PROBLEM — D64.9 ACUTE ANEMIA: Status: ACTIVE | Noted: 2021-01-01

## 2021-08-30 PROBLEM — A41.9 SEPSIS (HCC): Status: ACTIVE | Noted: 2021-01-01

## 2021-08-30 PROBLEM — E87.20 LACTIC ACIDOSIS: Status: ACTIVE | Noted: 2021-01-01

## 2021-08-30 NOTE — PROGRESS NOTES
MEWS score is 3. Dr Cannon Romberg aware. Orders received. Rectal temp taken twice to confirm reading.

## 2021-08-30 NOTE — PROGRESS NOTES
Lactic acid of 5.2 reviewed by Dr. Varner Co. Order received to administer 2L NS boluses. Primary nurse, Pamela Avila, is aware.

## 2021-08-30 NOTE — ED PROVIDER NOTES
Patient is a 71-year-old male with a past history of hypertension and medication noncompliance who presents for evaluation of altered mental status. He was at our facility on August 17 after having had a fall at home at some undisclosed point in time. He was found to have a burst fracture of L2 and old rib fractures and a possible partial occlusion or occlusion of his right common iliac. It was noted that he had weight loss at that time and that he was cachectic. Hemoglobin was noted to be 15.5 and he had no significant electrolyte abnormalities. He was discharged home to follow-up with PMD.  His appointment is on Monday, 8/30/2021. In the period of time since patient was last seen in the ER daughter notes that he has had increasing confusion when she called today she was concerned that he was not talking right so she came down to check on him. Patient is brought in by ambulance. Daughter states that he has been lying in bed for most of the last week. She does not know if he has been eating and drinking well. He does have a history of drinking beer daily and she does not know what his recent alcohol ingestion has been. Upon arrival patient has no specific complaints. He speaks in monosyllables, and seems to answer no to all questions. He does not currently endorse headache or chest pain or shortness of breath or abdominal pain. Daughter states that his bed was soiled when she arrived and patient has underwear soaked with urine and stool upon arrival.  Patient is moving his arms and legs equally, although weakly. He cannot sit up without assistance. He has no other history that he can offer upon arrival.  Does not know whether he has had a recent fall, does not know whether he had nausea or vomiting or diarrhea. He does not know whether he has had a fever sweats or chills cough or shortness of breath.            Past Medical History:   Diagnosis Date    Hypertension     non compliant with meds    Hypertension        No past surgical history on file. Family History:   Problem Relation Age of Onset    Diabetes Mother     Hypertension Mother        Social History     Socioeconomic History    Marital status: SINGLE     Spouse name: Not on file    Number of children: Not on file    Years of education: Not on file    Highest education level: Not on file   Occupational History    Not on file   Tobacco Use    Smoking status: Current Every Day Smoker     Packs/day: 0.50     Years: 54.00     Pack years: 27.00     Types: Cigarettes    Smokeless tobacco: Never Used   Substance and Sexual Activity    Alcohol use: Yes    Drug use: No    Sexual activity: Never   Other Topics Concern    Not on file   Social History Narrative    ** Merged History Encounter **          Social Determinants of Health     Financial Resource Strain:     Difficulty of Paying Living Expenses:    Food Insecurity:     Worried About Running Out of Food in the Last Year:     [address] in the Last Year:    Transportation Needs:     Lack of Transportation (Medical):  Lack of Transportation (Non-Medical):    Physical Activity:     Days of Exercise per Week:     Minutes of Exercise per Session:    Stress:     Feeling of Stress :    Social Connections:     Frequency of Communication with Friends and Family:     Frequency of Social Gatherings with Friends and Family:     Attends Shinto Services:     Active Member of Clubs or Organizations:     Attends Club or Organization Meetings:     Marital Status:    Intimate Partner Violence:     Fear of Current or Ex-Partner:     Emotionally Abused:     Physically Abused:     Sexually Abused: ALLERGIES: Patient has no known allergies.     Review of Systems   Unable to perform ROS: Mental status change       Vitals:    08/29/21 2100 08/29/21 2312 08/29/21 2353 08/30/21 0352   BP: 131/69 138/68 (!) 167/65 107/67   Pulse: 91 89 73 69   Resp: 18 16 20 20   Temp:  97.9 °F (36.6 °C) 97.2 °F (36.2 °C) 97.2 °F (36.2 °C)   SpO2: 100% 99% 95% 100%   Weight:   44.7 kg (98 lb 8 oz)    Height:   5' 7\" (1.702 m)             Physical Exam  Vitals and nursing note reviewed. Constitutional:       General: He is not in acute distress. Appearance: Normal appearance. He is not ill-appearing, toxic-appearing or diaphoretic. Comments: Cachectic   HENT:      Head: Normocephalic and atraumatic. Right Ear: External ear normal.      Left Ear: External ear normal.      Nose: Nose normal.      Mouth/Throat:      Pharynx: No oropharyngeal exudate or posterior oropharyngeal erythema. Comments: Dry  Eyes:      Extraocular Movements: Extraocular movements intact. Conjunctiva/sclera: Conjunctivae normal.      Pupils: Pupils are equal, round, and reactive to light. Cardiovascular:      Rate and Rhythm: Normal rate and regular rhythm. Heart sounds: No murmur heard. No friction rub. No gallop. Comments: 2+ left femoral pulse 1+ right femoral pulse noted. Pulmonary:      Effort: Pulmonary effort is normal. No respiratory distress. Breath sounds: Normal breath sounds. No wheezing or rales. Abdominal:      General: There is no distension. Palpations: Abdomen is soft. Tenderness: There is no abdominal tenderness. There is no right CVA tenderness, left CVA tenderness, guarding or rebound. Comments: Scaphoid abdomen   Musculoskeletal:         General: Tenderness present. No swelling, deformity or signs of injury. Cervical back: Normal range of motion and neck supple. No rigidity. No muscular tenderness. Right lower leg: No edema. Left lower leg: No edema. Comments: Moves arms and legs weakly but equally. There appears to be mild pain to palpation of the right hip and right groin area. No gross deformity is noted no lacerations are noted. Muscle wasting diffusely. Lymphadenopathy:      Cervical: No cervical adenopathy.    Skin: General: Skin is warm and dry. Capillary Refill: Capillary refill takes less than 2 seconds. Findings: No bruising, erythema or rash. Comments: Poor turgor   Neurological:      General: No focal deficit present. Mental Status: He is alert and oriented to person, place, and time. Cranial Nerves: No cranial nerve deficit or facial asymmetry. Sensory: No sensory deficit. Motor: Weakness present. Psychiatric:         Mood and Affect: Mood normal.         Thought Content: Thought content normal.         Judgment: Judgment normal.        Labs -  Recent Results (from the past 24 hour(s))   CBC WITH AUTOMATED DIFF    Collection Time: 08/29/21  7:45 PM   Result Value Ref Range    WBC 6.7 4.1 - 11.1 K/uL    RBC 3.65 (L) 4.10 - 5.70 M/uL    HGB 10.8 (L) 12.1 - 17.0 g/dL    HCT 32.4 (L) 36.6 - 50.3 %    MCV 88.8 80.0 - 99.0 FL    MCH 29.6 26.0 - 34.0 PG    MCHC 33.3 30.0 - 36.5 g/dL    RDW 14.2 11.5 - 14.5 %    PLATELET 208 810 - 333 K/uL    MPV 10.7 8.9 - 12.9 FL    NRBC 1.6 (H) 0  WBC    ABSOLUTE NRBC 0.11 (H) 0.00 - 0.01 K/uL    NEUTROPHILS 75 32 - 75 %    LYMPHOCYTES 19 12 - 49 %    MONOCYTES 5 5 - 13 %    EOSINOPHILS 0 0 - 7 %    BASOPHILS 0 0 - 1 %    IMMATURE GRANULOCYTES 1 (H) 0.0 - 0.5 %    ABS. NEUTROPHILS 5.0 1.8 - 8.0 K/UL    ABS. LYMPHOCYTES 1.3 0.8 - 3.5 K/UL    ABS. MONOCYTES 0.3 0.0 - 1.0 K/UL    ABS. EOSINOPHILS 0.0 0.0 - 0.4 K/UL    ABS. BASOPHILS 0.0 0.0 - 0.1 K/UL    ABS. IMM.  GRANS. 0.1 (H) 0.00 - 0.04 K/UL    DF AUTOMATED     METABOLIC PANEL, COMPREHENSIVE    Collection Time: 08/29/21  7:45 PM   Result Value Ref Range    Sodium 147 (H) 136 - 145 mmol/L    Potassium 3.4 (L) 3.5 - 5.1 mmol/L    Chloride 111 (H) 97 - 108 mmol/L    CO2 17 (L) 21 - 32 mmol/L    Anion gap 19 (H) 5 - 15 mmol/L    Glucose 117 (H) 65 - 100 mg/dL    BUN 42 (H) 6 - 20 MG/DL    Creatinine 1.30 0.70 - 1.30 MG/DL    BUN/Creatinine ratio 32 (H) 12 - 20      GFR est AA >60 >60 ml/min/1.73m2 GFR est non-AA 55 (L) >60 ml/min/1.73m2    Calcium 8.9 8.5 - 10.1 MG/DL    Bilirubin, total 1.7 (H) 0.2 - 1.0 MG/DL    ALT (SGPT) 13 12 - 78 U/L    AST (SGOT) 12 (L) 15 - 37 U/L    Alk.  phosphatase 43 (L) 45 - 117 U/L    Protein, total 6.6 6.4 - 8.2 g/dL    Albumin 2.6 (L) 3.5 - 5.0 g/dL    Globulin 4.0 2.0 - 4.0 g/dL    A-G Ratio 0.7 (L) 1.1 - 2.2     MAGNESIUM    Collection Time: 08/29/21  7:45 PM   Result Value Ref Range    Magnesium 2.1 1.6 - 2.4 mg/dL   LACTIC ACID    Collection Time: 08/29/21  7:45 PM   Result Value Ref Range    Lactic acid 3.0 (HH) 0.4 - 2.0 MMOL/L   TROPONIN I    Collection Time: 08/29/21  7:45 PM   Result Value Ref Range    Troponin-I, Qt. <0.05 <0.05 ng/mL   ETHYL ALCOHOL    Collection Time: 08/29/21  7:45 PM   Result Value Ref Range    ALCOHOL(ETHYL),SERUM <10 <10 MG/DL   AMMONIA    Collection Time: 08/29/21  7:45 PM   Result Value Ref Range    Ammonia 13 <32 UMOL/L   TSH 3RD GENERATION    Collection Time: 08/29/21  8:25 PM   Result Value Ref Range    TSH 1.56 0.36 - 3.74 uIU/mL   CK    Collection Time: 08/29/21  8:30 PM   Result Value Ref Range    CK 81 39 - 308 U/L   BLOOD GAS, ARTERIAL    Collection Time: 08/29/21  8:33 PM   Result Value Ref Range    pH 7.47 (H) 7.35 - 7.45      PCO2 27 (L) 35 - 45 mmHg    PO2 129 (H) 80 - 100 mmHg    O2 SAT 99 (H) 92 - 97 %    BICARBONATE 19 (L) 22 - 26 mmol/L    BASE DEFICIT 2.9 mmol/L    O2 METHOD ROOM AIR      Sample source ARTERIAL      SITE RIGHT RADIAL      EDI'S TEST YES     EKG, 12 LEAD, INITIAL    Collection Time: 08/29/21  9:24 PM   Result Value Ref Range    Ventricular Rate 80 BPM    Atrial Rate 80 BPM    P-R Interval 112 ms    QRS Duration 84 ms    Q-T Interval 432 ms    QTC Calculation (Bezet) 498 ms    Calculated P Axis 68 degrees    Calculated R Axis 78 degrees    Calculated T Axis 18 degrees    Diagnosis       Normal sinus rhythm  Nonspecific ST abnormality  Prolonged QT  Abnormal ECG  When compared with ECG of 12-DEC-2019 10:42,  QT has lengthened     OCCULT BLOOD, STOOL    Collection Time: 08/29/21  9:47 PM   Result Value Ref Range    Occult blood, stool Negative NEG     LACTIC ACID    Collection Time: 08/30/21 12:40 AM   Result Value Ref Range    Lactic acid 4.6 (HH) 0.4 - 2.0 MMOL/L   CBC WITH AUTOMATED DIFF    Collection Time: 08/30/21  3:40 AM   Result Value Ref Range    WBC 7.3 4.1 - 11.1 K/uL    RBC 3.58 (L) 4.10 - 5.70 M/uL    HGB 10.5 (L) 12.1 - 17.0 g/dL    HCT 32.1 (L) 36.6 - 50.3 %    MCV 89.7 80.0 - 99.0 FL    MCH 29.3 26.0 - 34.0 PG    MCHC 32.7 30.0 - 36.5 g/dL    RDW 14.4 11.5 - 14.5 %    PLATELET 058 (L) 134 - 400 K/uL    MPV 10.6 8.9 - 12.9 FL    NRBC 2.1 (H) 0  WBC    ABSOLUTE NRBC 0.15 (H) 0.00 - 0.01 K/uL    NEUTROPHILS 79 (H) 32 - 75 %    LYMPHOCYTES 15 12 - 49 %    MONOCYTES 5 5 - 13 %    EOSINOPHILS 0 0 - 7 %    BASOPHILS 0 0 - 1 %    IMMATURE GRANULOCYTES 1 (H) 0.0 - 0.5 %    ABS. NEUTROPHILS 5.8 1.8 - 8.0 K/UL    ABS. LYMPHOCYTES 1.1 0.8 - 3.5 K/UL    ABS. MONOCYTES 0.4 0.0 - 1.0 K/UL    ABS. EOSINOPHILS 0.0 0.0 - 0.4 K/UL    ABS. BASOPHILS 0.0 0.0 - 0.1 K/UL    ABS. IMM. GRANS. 0.1 (H) 0.00 - 0.04 K/UL    DF AUTOMATED     LACTIC ACID    Collection Time: 08/30/21  3:40 AM   Result Value Ref Range    Lactic acid 5.2 (HH) 0.4 - 2.0 MMOL/L     Radiologic Studies -   CT Results  (Last 48 hours)               08/30/21 0203  CT ABD PELV WO CONT Final result    Impression:  No acute abdominal or pelvic pathology. No significant change. Narrative:  EXAM: CT ABD PELV WO CONT       INDICATION: anemia, elevated lactate       COMPARISON: CT August 17, 2021       CONTRAST:  None. TECHNIQUE:    Thin axial images were obtained through the abdomen and pelvis. Coronal and   sagittal reformats were generated. Oral contrast was not administered. CT dose   reduction was achieved through use of a standardized protocol tailored for this   examination and automatic exposure control for dose modulation.         The absence of intravenous contrast material reduces the sensitivity for   evaluation of the vasculature and solid organs. FINDINGS:    LOWER THORAX: Coronary artery calcifications. LIVER: No mass. BILIARY TREE: Gallbladder is within normal limits. CBD is not dilated. SPLEEN: within normal limits. PANCREAS: No focal abnormality. ADRENALS: Unremarkable. KIDNEYS/URETERS: Left renal lesion is similar to prior study. No hydronephrosis   or hydroureter. STOMACH: Unremarkable. SMALL BOWEL: No dilatation or wall thickening. COLON: No dilatation or wall thickening. APPENDIX: Not identified   PERITONEUM: No ascites or pneumoperitoneum. RETROPERITONEUM: Severe diffuse calcific aortic atherosclerosis. Possible right   common iliac artery occlusion, not significantly changed. REPRODUCTIVE ORGANS: Prostate is noted   URINARY BLADDER: No mass or calculus. BONES: Again demonstrated is a burst fracture of L2 vertebral body, and an   inferior endplate fracture at L3, similar to the prior study. Severe   degenerative disc disease at L5-S1. ABDOMINAL WALL: No mass or hernia. ADDITIONAL COMMENTS: N/A           08/29/21 2117  CT CHEST WO CONT Final result    Impression:      1. No acute thoracic abnormality. 2. Severe coronary artery calcific atherosclerosis. Narrative:  INDICATION:  Weight loss       COMPARISON:  None       TECHNIQUE: Without IV contrast material, 5 mm axial images were obtained through   the chest.  Coronal reconstructions were obtained. CT dose reduction was achieved through the use of a standardized protocol   tailored for this examination and automatic exposure control for dose   modulation. FINDINGS:       There are no enlarged mediastinal or hilar lymph nodes. The heart size is   normal. There are severe coronary artery calcifications, as well as diffuse   aortic calcific atherosclerosis. There is no pericardial effusion.        There is no focal air space disease. No pulmonary nodule or mass is seen. There   are no pleural effusions. Limited evaluation of the upper abdomen demonstrates no abnormality. The osseous   structures are unremarkable. 08/29/21 2116  CT HEAD WO CONT Final result    Impression:  1. No evidence of acute infarct or intracranial hemorrhage. 2. Periventricular white matter disease is likely secondary to chronic small   vessel ischemic changes. 3. Generalized atrophy. Narrative: Indication:  ALTERED MENTAL STATUS        Comparison: None       Findings: 5 mm axial images were obtained from the skull base through the   vertex. CT dose reduction was achieved through the use of a standardized protocol   tailored for this examination and automatic exposure control for dose   modulation. Study is technically limited by patient motion. The ventricles and cortical sulci are prominent, compatible with age related   volume loss. There is no evidence of intracranial hemorrhage, mass, mass effect,   or acute infarct. There is a chronic right basal ganglia lacunar infarct. There   is periventricular white matter disease. No extra-axial fluid collections are   seen. The visualized paranasal sinuses and mastoid air cells are clear. The   orbital structures are unremarkable. No osseous abnormalities are seen. XR Results (most recent):  Results from Hospital Encounter encounter on 08/29/21    XR HIP RT W OR WO PELV 2-3 VWS    Narrative  Clinical Data:  PAIN    Comparison;  None. FINDINGS: AP view of the pelvis and frogleg lateral view of the right  hip  demonstrate no evidence of acute fracture or dislocation. The bones are  osteopenic. Vascular calcifications are noted. Impression  No evidence of fracture or dislocation.         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 Vera Morrow)    Risk of Complications, Morbidity, and/or Mortality  Presenting problems: high  Diagnostic procedures: high  Management options: high    Patient Progress  Patient progress: stable    ED Course as of Aug 30 0423   Mon Aug 30, 2021   0357 EKG demonstrates normal sinus rhythm, normal ventricular rate of 80, normal OH interval 112, normal QRS duration of 84, prolonged QTc of 498, artifact is noted, no evidence of acute ischemia or infarction, compared to EKG of 12/12/2019 QT is prolonged otherwise no significant changes    [PS]   0418 Patient CT head chest are unremarkable except for degenerative changes. X-ray of the hip was without fracture or acute process. Stool for occult blood was negative, CK and TSH were normal, blood gas on room air had a pH of 7.47 PCO2 27 PO2 of 129, O2 sat of 99%. Lactic acid is slightly elevated at 3, magnesium is normal at 2, CBC had a hemoglobin of 10.8 which is down from a hemoglobin of 15.5 about 2 weeks ago, white count is stable at 6.7, platelet counts stable at 162, CMP remarkable for sodium of 147 potassium 3.4 CO2 of 17 anion gap of 19 glucose 117 BUN is elevated at 42 creatinine 1.3 GFR was estimated at greater than 60, bilirubin elevated at 1.7 ALT and AST and alk phos were normal.  Patient appeared improved with hydration while in the ED. He became more verbal although still was confused.   Care was discussed with his daughter and she stated she did not know what patient's wishes were so for his CODE STATUS. Patient will be full code at this point and daughter will discuss CODE STATUS with family members as patient is not capable of making a decision about his status at this point. She states that over the week as noted in HPI patient's been increasingly more confused when she was at the house today she is not sure if there was blood or not in the toilet. Patient was in his bed and disheveled state and was incontinent of urine and stool. Patient has no obvious source of blood loss. He will need to be admitted to hospital for close observation and further evaluation as deemed necessary.     [PS]      ED Course User Index  [PS] Lorraine De Anda MD       Critical Care  Performed by: Lorraine De Anda MD  Authorized by: Lorraine De Anda MD     Critical care provider statement:     Critical care time (minutes):  35    Critical care time was exclusive of:  Separately billable procedures and treating other patients and teaching time    Critical care was necessary to treat or prevent imminent or life-threatening deterioration of the following conditions:  Renal failure, sepsis, shock and dehydration    Critical care was time spent personally by me on the following activities:  Development of treatment plan with patient or surrogate, discussions with consultants, evaluation of patient's response to treatment, examination of patient, obtaining history from patient or surrogate, review of old charts, re-evaluation of patient's condition, pulse oximetry, ordering and review of radiographic studies, ordering and review of laboratory studies and ordering and performing treatments and interventions    I assumed direction of critical care for this patient from another provider in my specialty: no          Orders Placed This Encounter    CULTURE, BLOOD    CULTURE, BLOOD    CT HEAD WO CONT    XR HIP RT W OR WO PELV 2-3 VWS    CT CHEST WO CONT    CT ABD PELV WO CONT    CBC WITH AUTOMATED DIFF    CMP    MAGNESIUM    LACTIC ACID, PLASMA    TROPONIN I    ALCOHOL    TSH, 3RD GENERATION    VITAMIN B12    AMMONIA    CK    OCCULT BLOOD, STOOL    BLOOD GAS, ARTERIAL    BLOOD GAS, ARTERIAL    CBC WITH AUTOMATED DIFF    LACTIC ACID    METABOLIC PANEL, COMPREHENSIVE    DRUG SCREEN, URINE    URINALYSIS W/ RFLX MICROSCOPIC    LACTIC ACID    METABOLIC PANEL, COMPREHENSIVE    DIET NPO    CARDIAC MONITORING    NOTIFY PROVIDER: SPECIFY Notify provider on pt's arrival to floor ONE TIME STAT    VITAL SIGNS PER UNIT ROUTINE    BEDREST, COMPLETE    B/P    CARDIAC MONITOR - ED ONLY    CRITICAL CARE (ASAP ONLY)    FULL CODE    RT--ARTERIAL STICK FOR LAB    RT--OXIMETRY, CONTINUOUS    EKG, 12 LEAD, INITIAL    sodium chloride 0.9 % bolus infusion 1,000 mL    DISCONTD: 0.9% sodium chloride infusion    DISCONTD: 0.9% sodium chloride infusion    0.9% sodium chloride infusion    cefTRIAXone (ROCEPHIN) 1 g in 0.9% sodium chloride (MBP/ADV) 50 mL MBP    sodium chloride 0.9 % bolus infusion 1,000 mL    IP CONSULT TO HOSPITALIST    INITIAL PHYSICIAN ORDER: OBSERVATION/OUTPATIENT IN A BED OBSERVATION; Remote Telemetry;  Yes; anemia, dehydration, altered mental status       Medications   0.9% sodium chloride infusion (100 mL/hr IntraVENous New Bag 8/30/21 0020)   sodium chloride 0.9 % bolus infusion 1,000 mL (0 mL IntraVENous IV Completed 8/29/21 2025)   cefTRIAXone (ROCEPHIN) 1 g in 0.9% sodium chloride (MBP/ADV) 50 mL MBP (0 g IntraVENous Stopped 8/30/21 0134)   sodium chloride 0.9 % bolus infusion 1,000 mL (1,000 mL IntraVENous New Bag 8/30/21 0134)       Patient Vitals for the past 8 hrs:   Temp Pulse Resp BP SpO2   08/30/21 0352 97.2 °F (36.2 °C) 69 20 107/67 100 %   08/29/21 2353 97.2 °F (36.2 °C) 73 20 (!) 167/65 95 %   08/29/21 2312 97.9 °F (36.6 °C) 89 16 138/68 99 %   08/29/21 2100  91 18 131/69 100 % DIAGNOSIS:      ICD-10-CM ICD-9-CM    1. Altered mental status, unspecified altered mental status type  R41.82 780.97    2. Anemia, unspecified type  D64.9 285.9    3. Lactic acidosis  E87.2 276.2    4. Dehydration  E86.0 276.51    5. [facility]  R64 799.4         I have reviewed all pertinent and currently available diagnostic test results for this visit including, but not limited to, labs, xrays, and EKGs. I have reviewed all pertinent and currently available medical records, past medical history, social history and family history. My plan of care and further evaluation and/or disposition is based on these results, as well as the initial, and subsequent, history and physical exam, as well as any additional complaints during the visit. Laboratory tests, medications, x-rays, diagnosis, and need for admission or transfer have been reviewed and discussed with the patient and/or family. Pt and/or family have had the opportunity to ask questions about their care. Patient and/or family verbalized understanding and agreement with care plan. After review of patient's ED evaluation I feel patient will benefit from admission to hospital due to need for IV fluids, close monitoring of his hemodynamic status and mental status and further work-up of his cachexia and anemia    Angie Metzger MD    Please note that this dictation was completed with [employer], the computer voice recognition software. Quite often unanticipated grammatical, syntax, homophones, and other interpretive errors are inadvertently transcribed by the computer software. Please disregard these errors. Please excuse any errors that have escaped final proofreading.

## 2021-08-30 NOTE — ED NOTES
Report given to  PREMA Darden Rn_RN, all questions answered. Patient transported to , condition unchanged. NAD noted when leaving department.

## 2021-08-30 NOTE — PROGRESS NOTES
Patient was admitted to hospital for evaluation of altered mental status, monitoring of his hemodynamic status and anemia. He did not appear to have significant abdominal pain on initial exam, was not acidotic and had a slightly elevated lactic acid. Mental status seemed to improve after being given fluids in the ER. Shaista Goldman Patient was hydrated with a liter of saline and lactic acid returned elevated at 4.6. Initial presentation was not consistent with mesenteric ischemia as patient was not having pain out of proportion to exam and did not have acidosis. CT of the abdomen pelvis was obtained after the lactic acid returned elevated which was unremarkable for acute process, appears similar to ED CT that was done within the last 2 weeks. He was given another liter of normal saline and lactic acid will be repeated. Patient hemodynamically has been stable while on the floor. He has some apparent bladder outlet obstruction and it was unable to pass a Bradshaw catheter, although patient had was incontinent of urine with urine soaked underwear upon arrival to the ED, which implies patient can pass urine. Hemoglobin will be repeated. There is no obvious source of blood loss as a cause of his anemia, as patient's stool was negative for blood and there is no obvious source of bleeding from hematoma or laceration. Patient may have had a GI bleed earlier this week which resolved and no longer is evident. He did have some brown discoloration of his lips which were parched and dry but he has had no epigastric pain to palpation and has not vomited blood while in the ED or on the floor.

## 2021-08-30 NOTE — PROGRESS NOTES
Care Management Interventions  PCP Verified by CM: Yes Ebony De Jesus NP )  Last Visit to PCP: 11/30/18  Palliative Care Criteria Met (RRAT>21 & CHF Dx)?: No  Mode of Transport at Discharge: Other (see comment) (TBD)  Transition of Care Consult (CM Consult): Discharge Planning  MyChart Signup: No  Discharge Durable Medical Equipment: No  Physical Therapy Consult: No  Occupational Therapy Consult: No  Speech Therapy Consult: No  Current Support Network: Own Home, Lives Alone  Confirm Follow Up Transport: Family   Resource Information Provided?: No  Discharge Location  Discharge Placement: Transferred to higher level of care (Awaiting for bed at [facility])     Mr. Shiloh Lange was admitted under Observation status 8/29/21 with AMS, anemia, dehydration. The State Observation notice was received at admission. The MOON was explained and obtained at this care management intake. Daughter Rob Akins was with Mr. Shiloh Lange. I gave her the care management introductory letter with contact information. Mr. Shiloh Lange is quite ill and apparently has declined markedly over a short period of time, with sizeable weight loss. He had been living alone, but from current observations this will not likely be a viable option as long term care plans are arranged. [facility] has accepted Mr. De Oliveira, but they do not have a bed available so there is a wait time for transfer. Mr. Shiloh Lange has Medicare only so there will be a co pay for hospitalization. Daughter said he had limited income, Social Security and a small intermediate check. He does not have Medicaid. I gave her the [facility] [employer] with instructions. I also gave her the two application booklets for the Medicaid long term care application with a list citing verifications needed for application processing. Further, I explained that a UAI screening will need to be requested.  Mr. Shiloh Lange does not have an Advance Directive; daughter Everton Muñiz is the primary healthcare decision maker. Reason for Admission:  Altered Mental Status, Anemia, Dehydration                     RUR Score:          N/A Observation    RRAT:  5 LOW           Plan for utilizing home health:      No    PCP: First and Last name:  Mary Emanuel NP     Name of Practice: [facility]   Are you a current patient: Yes/No: Yes, but it has been nearly three years since last visit. Approximate date of last visit: 11/30/18   Can you participate in a virtual visit with your PCP: NO                    Current Advanced Directive/Advance Care Plan: Full Code      Healthcare Decision Maker:   Click here to complete [address] including selection of the [address] Relationship (ie \"Primary\")    Emanuel Marx, [phone]                    Transition of Care Plan:     Awaiting transfer to a higher level of care.

## 2021-08-30 NOTE — PROGRESS NOTES
Pharmacy Antimicrobial Dosing    Indication for Antimicrobials: sepsis  Current Regimen of Each Antimicrobial (Start Day & Day of Therapy):  Cefepime 2 g IV every 12 hours- Day 1  Vancomycin 1000 mg IV once - to be followed by pharmacy- day 1  Levaquin 750 mg IV every 48 hours - day 1    D/C : Ceftriaxone    Goal Vancomycin Level : 15-20 mcg/ml  Level(s) Ordered: Random trough [email or web address] on 21      Significant Cultures:  pending    Paralysis, amputations:   Recent Labs     21  0802 21  0610 21  0340 21  1945   CREA 1.15 1.15  --  1.30   BUN 38* 44*  --  42*   WBC  --   --  7.3 6.7     Temp (24hrs), Av.8 °F (36 °C), Min:94.1 °F (34.5 °C), Max:97.9 °F (36.6 °C)    Creatinine Clearance (Creatinine Clearance (ml/min)): 37.8 ml/min       Impression/Plan:   Patient on renally dosed Levaquin and Cefepime  Patient also on Vancomycin 1000 mg IV once to be followed by 500 mg IV q12h for a predicted  and predicted trough about 18mg/ml    Random trough ordered at 0400 on 21  Patient is afebrile with unremarkable labs       Pharmacy will follow daily and adjust medications as appropriate for renal function and/or serum levels.     Thank you,  Haylie Marks, ROBINSON     Renal Dosing Tables on [employer]

## 2021-08-30 NOTE — PROGRESS NOTES
Problem: General Medical Care Plan  Goal: *Vital signs within specified parameters  Outcome: Progressing Towards Goal  Goal: *Labs within defined limits  Outcome: Progressing Towards Goal  Goal: *Absence of infection signs and symptoms  Outcome: Progressing Towards Goal  Goal: *Optimal pain control at patient's stated goal  Outcome: Progressing Towards Goal  Goal: *Skin integrity maintained  Outcome: Progressing Towards Goal  Goal: *Fluid volume balance  Outcome: Progressing Towards Goal  Goal: *Optimize nutritional status  Outcome: Progressing Towards Goal

## 2021-08-30 NOTE — PROGRESS NOTES
Bedside shift change report given to Jess Lennox RN (oncoming nurse) by Zane Dennis. Adelso DIANE (offgoing nurse). Report included the following information Kardex.

## 2021-08-30 NOTE — PROGRESS NOTES
Bedside shift change report given to JOEL Morales RN (oncoming nurse) by Raquel Holden RN (offgoing nurse). Report included the following information Kardex, Intake/Output and Recent Results.

## 2021-08-30 NOTE — H&P
[facility]   Admission History & Physical        8/30/2021 9:29 AM  Patient: Michael Martinez 1950  PCP: Andria Beal NP    HISTORY  Chief Complaint:   Chief Complaint   Patient presents with    Altered mental status       HPI: 80 y.o. male presenting for admission to [facility] for further evaluation and treatment for Altered mental status. He  has a past medical history of Hypertension and Hypertension. Karina May He presented to the ED last evening around 8 PM via EMS after being found by his daughter in the bed with altered mental status and incontinence of urine and bowel. She had come to see him since he was not communicating as normal during a phone conversation earlier that day. Patient had been in the ED on August 17 for evaluation of a fall. 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 He was discharged home with a scheduled follow-up with his PCP August 30. His daughter contacted him today and she felt he was not communicating normally. She traveled to [facility] to check on him and found him lying in the bed with altered mental status and urine and stool continence. On arrival in the ED his underwear was soaked with urine and stool. The daughter thinks he had been in the bed most of the week since the ED visit and was uncertain how well he was eating or drinking. He is a known alcoholic and it is unknown when his last drink was. Arrival the patient was arousable and communicative and short responses, but was noted to answer most questions with a no. Not appear to have any chest pain or abdominal pain. Denied complaints of fever, chill, shortness of breath. Extensive work-up was remarkable for normal vital signs. He met no criteria for SIRS. His CBC was remarkable for a white count of 6.7 with normal differential.  Hemoglobin was 10.8markedly down from the previous value of 15.8 on August 17.   Fecal occult blood testing was negative. He did have some oral dryness with some blood crusting on his lips, however there was no history for hematemesis or upper GI bleed. The patient had been treated in the past for hypertension, but his last office follow-up on epic was in 2019. He was was assessed with CT scanning of the chest, abdomen, pelvis and head. CT was noted for small vessel changes and generalized atrophy. CT of the chest suggested severe coronary artery calcific atherosclerosis. Abdomen and pelvis showed no acute abdominal or pelvic pathology. On his earlier visit August 17 the abdominal pelvic CT report showed possible occlusion of the right common iliac artery with severe atherosclerotic vascular disease. Labs included an elevated 3.0 on presentation but subsequently bharathi to 4.6, then 5.2 in spite of hydration with IV fluids. Labs on presentation were noted for mild metabolic acidosis with a CO2 of 17 and some dehydration with a BUN of 42 and a creatinine of 1.3up from 1.07 10 days prior. Serum alcohol level was less than 10, as it had been on the prior visit August 17. Cultures from 2 sites were obtained. The arterial blood gas showed a normal pH 7.47 with PCO2 of 27 PO2 129 with O2 sat 99% obtained on room air. This suggested a mixed acid-base disorder with an alkaline pH. Urinalysis with reflex culture was ordered but has not yet been obtained. Patient received IV Rocephin. Patient received IV fluids including 4 L given as bolus since presentation. Follow-up labs showed a further drop in the serum bicarb from 17-15. Patient has had no evidence for bleeding, no hematemesis or blood per rectum. Patient is not able to provide further history or review of systems at this time. Past Medical History:  Past Medical History:   Diagnosis Date    Hypertension     non compliant with meds    Hypertension        Past Surgical History:  No past surgical history on file.     Medication:  Prior to Admission medications    Medication Sig Start Date End Date Taking? Authorizing Provider   acetaminophen (TYLENOL) 500 mg tablet Take 1 Tablet by mouth every six (6) hours as needed for Pain. 8/17/21   Leslie Lopez MD   aspirin delayed-release 81 mg tablet Take 1 Tab by mouth daily. 12/3/18   Danie INFANTE NP   NIFEdipine ER (PROCARDIA XL) 30 mg ER tablet Take 1 Tab by mouth daily. 12/3/18   Danie INFANTE NP   hydroCHLOROthiazide (HYDRODIURIL) 25 mg tablet Take 1 Tab by mouth daily. 12/3/18   Ashish Peterson NP       Allergies:  No Known Allergies    Social History:  Social History     Tobacco Use    Smoking status: Current Every Day Smoker     Packs/day: 0.50     Years: 54.00     Pack years: 27.00     Types: Cigarettes    Smokeless tobacco: Never Used   Substance Use Topics    Alcohol use:  Yes    Drug use: No       Family History:  Family History   Problem Relation Age of Onset    Diabetes Mother     Hypertension Mother        ROS:  I am not able to complete the review of systems because: (Pt not able, AMS)    Total of 12 systems reviewed as follows:  POSITIVE= bolded text  Negative = text not bolded       General:  fever, chills, sweats, generalized weakness, weight loss/gain, loss of appetite   Eyes:    blurred vision, eye pain, loss of vision, double vision  ENT:    rhinorrhea, pharyngitis   Respiratory:  cough, sputum production, SOB, VASQUEZ, wheezing, pleuritic pain   Cardiology:   chest pain, palpitations, orthopnea, PND, edema, syncope   Gastrointestinal:  abdominal pain , N/V, diarrhea, dysphagia, constipation, bleeding   Genitourinary:  frequency, urgency, dysuria, hematuria, incontinence, prostatism   Muskuloskeletal: arthralgia, myalgia, back pain  Hematology:   easy bruising, nose or gum bleeding, lymphadenopathy   Dermatological: rash, ulceration, pruritis, color change / jaundice  Endocrine:   hot flashes or polydipsia   Neurological:  headache, dizziness, confusion, focal weakness, paresthesia, speech difficulties, memory loss, gait difficulty  Psychological: feelings of anxiety, depression, agitation      PHYSICAL EXAM:  Patient Vitals for the past 24 hrs:   Temp Pulse Resp BP SpO2   08/30/21 0802 (!) 94.1 °F (34.5 °C) 73 20 118/82 100 %   08/30/21 0725     99 %   08/30/21 0352 97.2 °F (36.2 °C) 69 20 107/67 100 %   08/29/21 2353 97.2 °F (36.2 °C) 73 20 (!) 167/65 95 %   08/29/21 2312 97.9 °F (36.6 °C) 89 16 138/68 99 %   08/29/21 2100  91 18 131/69 100 %   08/29/21 1905 97.8 °F (36.6 °C) 97 16 125/75 100 %       General:    Alert and responsive to verbal stimulation. , no distress, appears stated age. He states his first name is Kareen Arreola. He mumbles responding to additional questions with out convincingly accurate details  HEENT: Atraumatic, anicteric sclerae, pink conjunctivae     No oral ulcers, mucosa dry, throat clear, dentition fair     Some dried mucus with suspected dried blood at the corner of his mouth  Neck:  Supple, symmetrical;   thyroid non tender  Lungs:   Clear to auscultation bilaterally. No wheezing or rhonchi. No rales. Chest wall:  No tenderness. No accessory muscle use. Heart:   Regular rhythm. No  murmur. No edema  Abdomen:   Soft, has guarding to finger touch palpation. Not distended. Bowel sounds present but diminished. Patient has persistent hiccups  Extremities: No cyanosis. No clubbing. No edema     Capillary refill normal,  Radial pulse 2+,  DP 1+ L    R LE cool to tough compared to L, cannot palpate DP on R  Skin:     Not pale. Not jaundiced. No rashes   Psych:  Not anxious or agitated. Neurologic: EOMs intact. No facial asymmetry. No aphasia or slurred speech. Symmetrical strength, Sensation grossly intact. Alert, wondering gaze w/o clear focusing,    Responds to commands.  Responds to simple questions with repeated efforts by examiner    Lab Data Reviewed:    Recent Results (from the past 24 hour(s))   CBC WITH AUTOMATED DIFF Collection Time: 08/29/21  7:45 PM   Result Value Ref Range    WBC 6.7 4.1 - 11.1 K/uL    RBC 3.65 (L) 4.10 - 5.70 M/uL    HGB 10.8 (L) 12.1 - 17.0 g/dL    HCT 32.4 (L) 36.6 - 50.3 %    MCV 88.8 80.0 - 99.0 FL    MCH 29.6 26.0 - 34.0 PG    MCHC 33.3 30.0 - 36.5 g/dL    RDW 14.2 11.5 - 14.5 %    PLATELET 977 934 - 099 K/uL    MPV 10.7 8.9 - 12.9 FL    NRBC 1.6 (H) 0  WBC    ABSOLUTE NRBC 0.11 (H) 0.00 - 0.01 K/uL    NEUTROPHILS 75 32 - 75 %    LYMPHOCYTES 19 12 - 49 %    MONOCYTES 5 5 - 13 %    EOSINOPHILS 0 0 - 7 %    BASOPHILS 0 0 - 1 %    IMMATURE GRANULOCYTES 1 (H) 0.0 - 0.5 %    ABS. NEUTROPHILS 5.0 1.8 - 8.0 K/UL    ABS. LYMPHOCYTES 1.3 0.8 - 3.5 K/UL    ABS. MONOCYTES 0.3 0.0 - 1.0 K/UL    ABS. EOSINOPHILS 0.0 0.0 - 0.4 K/UL    ABS. BASOPHILS 0.0 0.0 - 0.1 K/UL    ABS. IMM. GRANS. 0.1 (H) 0.00 - 0.04 K/UL    DF AUTOMATED     METABOLIC PANEL, COMPREHENSIVE    Collection Time: 08/29/21  7:45 PM   Result Value Ref Range    Sodium 147 (H) 136 - 145 mmol/L    Potassium 3.4 (L) 3.5 - 5.1 mmol/L    Chloride 111 (H) 97 - 108 mmol/L    CO2 17 (L) 21 - 32 mmol/L    Anion gap 19 (H) 5 - 15 mmol/L    Glucose 117 (H) 65 - 100 mg/dL    BUN 42 (H) 6 - 20 MG/DL    Creatinine 1.30 0.70 - 1.30 MG/DL    BUN/Creatinine ratio 32 (H) 12 - 20      GFR est AA >60 >60 ml/min/1.73m2    GFR est non-AA 55 (L) >60 ml/min/1.73m2    Calcium 8.9 8.5 - 10.1 MG/DL    Bilirubin, total 1.7 (H) 0.2 - 1.0 MG/DL    ALT (SGPT) 13 12 - 78 U/L    AST (SGOT) 12 (L) 15 - 37 U/L    Alk.  phosphatase 43 (L) 45 - 117 U/L    Protein, total 6.6 6.4 - 8.2 g/dL    Albumin 2.6 (L) 3.5 - 5.0 g/dL    Globulin 4.0 2.0 - 4.0 g/dL    A-G Ratio 0.7 (L) 1.1 - 2.2     MAGNESIUM    Collection Time: 08/29/21  7:45 PM   Result Value Ref Range    Magnesium 2.1 1.6 - 2.4 mg/dL   LACTIC ACID    Collection Time: 08/29/21  7:45 PM   Result Value Ref Range    Lactic acid 3.0 (HH) 0.4 - 2.0 MMOL/L   TROPONIN I    Collection Time: 08/29/21  7:45 PM   Result Value Ref Range Troponin-I, Qt. <0.05 <0.05 ng/mL   ETHYL ALCOHOL    Collection Time: 08/29/21  7:45 PM   Result Value Ref Range    ALCOHOL(ETHYL),SERUM <10 <10 MG/DL   AMMONIA    Collection Time: 08/29/21  7:45 PM   Result Value Ref Range    Ammonia 13 <32 UMOL/L   CULTURE, BLOOD    Collection Time: 08/29/21  7:45 PM    Specimen: Blood   Result Value Ref Range    Special Requests: NO SPECIAL REQUESTS      Culture result: NO GROWTH AFTER 6 HOURS     CULTURE, BLOOD    Collection Time: 08/29/21  7:50 PM    Specimen: Blood   Result Value Ref Range    Special Requests: NO SPECIAL REQUESTS      Culture result: NO GROWTH AFTER 6 HOURS     TSH 3RD GENERATION    Collection Time: 08/29/21  8:25 PM   Result Value Ref Range    TSH 1.56 0.36 - 3.74 uIU/mL   CK    Collection Time: 08/29/21  8:30 PM   Result Value Ref Range    CK 81 39 - 308 U/L   BLOOD GAS, ARTERIAL    Collection Time: 08/29/21  8:33 PM   Result Value Ref Range    pH 7.47 (H) 7.35 - 7.45      PCO2 27 (L) 35 - 45 mmHg    PO2 129 (H) 80 - 100 mmHg    O2 SAT 99 (H) 92 - 97 %    BICARBONATE 19 (L) 22 - 26 mmol/L    BASE DEFICIT 2.9 mmol/L    O2 METHOD ROOM AIR      Sample source ARTERIAL      SITE RIGHT RADIAL      EDI'S TEST YES     EKG, 12 LEAD, INITIAL    Collection Time: 08/29/21  9:24 PM   Result Value Ref Range    Ventricular Rate 80 BPM    Atrial Rate 80 BPM    P-R Interval 112 ms    QRS Duration 84 ms    Q-T Interval 432 ms    QTC Calculation (Bezet) 498 ms    Calculated P Axis 68 degrees    Calculated R Axis 78 degrees    Calculated T Axis 18 degrees    Diagnosis       Normal sinus rhythm  Nonspecific ST abnormality  Prolonged QT  Abnormal ECG  When compared with ECG of 12-DEC-2019 10:42,  QT has lengthened  Confirmed by Helen Ramos ([id number]) on 8/30/2021 8:08:03 AM     OCCULT BLOOD, STOOL    Collection Time: 08/29/21  9:47 PM   Result Value Ref Range    Occult blood, stool Negative NEG     LACTIC ACID    Collection Time: 08/30/21 12:40 AM   Result Value Ref Range Lactic acid 4.6 (HH) 0.4 - 2.0 MMOL/L   CBC WITH AUTOMATED DIFF    Collection Time: 08/30/21  3:40 AM   Result Value Ref Range    WBC 7.3 4.1 - 11.1 K/uL    RBC 3.58 (L) 4.10 - 5.70 M/uL    HGB 10.5 (L) 12.1 - 17.0 g/dL    HCT 32.1 (L) 36.6 - 50.3 %    MCV 89.7 80.0 - 99.0 FL    MCH 29.3 26.0 - 34.0 PG    MCHC 32.7 30.0 - 36.5 g/dL    RDW 14.4 11.5 - 14.5 %    PLATELET 944 (L) 014 - 400 K/uL    MPV 10.6 8.9 - 12.9 FL    NRBC 2.1 (H) 0  WBC    ABSOLUTE NRBC 0.15 (H) 0.00 - 0.01 K/uL    NEUTROPHILS 79 (H) 32 - 75 %    LYMPHOCYTES 15 12 - 49 %    MONOCYTES 5 5 - 13 %    EOSINOPHILS 0 0 - 7 %    BASOPHILS 0 0 - 1 %    IMMATURE GRANULOCYTES 1 (H) 0.0 - 0.5 %    ABS. NEUTROPHILS 5.8 1.8 - 8.0 K/UL    ABS. LYMPHOCYTES 1.1 0.8 - 3.5 K/UL    ABS. MONOCYTES 0.4 0.0 - 1.0 K/UL    ABS. EOSINOPHILS 0.0 0.0 - 0.4 K/UL    ABS. BASOPHILS 0.0 0.0 - 0.1 K/UL    ABS. IMM. GRANS. 0.1 (H) 0.00 - 0.04 K/UL    DF AUTOMATED     LACTIC ACID    Collection Time: 08/30/21  3:40 AM   Result Value Ref Range    Lactic acid 5.2 (HH) 0.4 - 2.0 MMOL/L   METABOLIC PANEL, COMPREHENSIVE    Collection Time: 08/30/21  6:10 AM   Result Value Ref Range    Sodium 152 (H) 136 - 145 mmol/L    Potassium 4.1 3.5 - 5.1 mmol/L    Chloride 118 (H) 97 - 108 mmol/L    CO2 15 (LL) 21 - 32 mmol/L    Anion gap 19 (H) 5 - 15 mmol/L    Glucose 99 65 - 100 mg/dL    BUN 44 (H) 6 - 20 MG/DL    Creatinine 1.15 0.70 - 1.30 MG/DL    BUN/Creatinine ratio 38 (H) 12 - 20      GFR est AA >60 >60 ml/min/1.73m2    GFR est non-AA >60 >60 ml/min/1.73m2    Calcium 8.1 (L) 8.5 - 10.1 MG/DL    Bilirubin, total 1.3 (H) 0.2 - 1.0 MG/DL    ALT (SGPT) 13 12 - 78 U/L    AST (SGOT) 17 15 - 37 U/L    Alk.  phosphatase 42 (L) 45 - 117 U/L    Protein, total 5.6 (L) 6.4 - 8.2 g/dL    Albumin 2.4 (L) 3.5 - 5.0 g/dL    Globulin 3.2 2.0 - 4.0 g/dL    A-G Ratio 0.8 (L) 1.1 - 2.2     METABOLIC PANEL, BASIC    Collection Time: 08/30/21  8:02 AM   Result Value Ref Range    Sodium 151 (H) 136 - 145 mmol/L    Potassium 3.1 (L) 3.5 - 5.1 mmol/L    Chloride 116 (H) 97 - 108 mmol/L    CO2 17 (L) 21 - 32 mmol/L    Anion gap 18 (H) 5 - 15 mmol/L    Glucose 104 (H) 65 - 100 mg/dL    BUN 38 (H) 6 - 20 MG/DL    Creatinine 1.15 0.70 - 1.30 MG/DL    BUN/Creatinine ratio 33 (H) 12 - 20      GFR est AA >60 >60 ml/min/1.73m2    GFR est non-AA >60 >60 ml/min/1.73m2    Calcium 7.9 (L) 8.5 - 10.1 MG/DL   LACTIC ACID    Collection Time: 08/30/21  8:02 AM   Result Value Ref Range    Lactic acid 3.4 (HH) 0.4 - 2.0 MMOL/L     Urine:  Pending cath sample (incontinent)    EKG: NSR 80 bpm, NSTWC, Prolonged QT    Radiology:  XR CHEST PORT   Final Result   Evidence of pulmonary hyperaeration. CT ABD PELV WO CONT   Final Result   No acute abdominal or pelvic pathology. No significant change. XR HIP RT W OR WO PELV 2-3 VWS   Final Result   No evidence of fracture or dislocation. CT CHEST WO CONT   Final Result      1. No acute thoracic abnormality. 2. Severe coronary artery calcific atherosclerosis. CT HEAD WO CONT   Final Result   1. No evidence of acute infarct or intracranial hemorrhage. 2. Periventricular white matter disease is likely secondary to chronic small   vessel ischemic changes. 3. Generalized atrophy. Care Plan discussed with:   Patient x    Family     RN x         Consultant      Expected  Disposition:   Home with Family x   HH/PT/OT/RN    SNF/LTC    MILEY      TOTAL TIME:  61 Minutes      Comments    x Reviewed previous records   >50% of visit spent in counseling and coordination of care x Discussion with patient and/or family and questions answered       _______________________________________________________  Given the patient's current clinical presentation, I have a high level of concern for decompensation if discharged from the emergency department.   Complex decision making was performed, which includes reviewing the patient's available past medical records, laboratory results, and x-ray films. My assessment of this patient's clinical condition and my plan of care is as follows.     ASSESSMENT / PLAN    Principal Problem:    Altered mental status (8/29/2021)  Active Problems:    Lactic acidosis (8/30/2021)    Sepsis ([address] (8/30/2021)  Blood / Urine Cultures  Has had Rocephin x 1  Rx Cefepime, Levaquin, Vancomycin   Evidence for diffuse vascular disease  Concern for ischemia bowel  Persistent acidosis  Discuss transfer to higher level of care with family      Acute anemia (8/30/2021)  Concerned with GI bleed  IV Protonix  Monitor serial Hg      ETOH abuse (8/30/2021)  No apparent recent intake with neg levels on 8/17 and 8/29  Will give Thiamine IV      Essential hypertension (12/3/2018)  Holding PTA Procardia / HCTZ - compliance doubted as well      SAFETY:   Code Status:Full (family discussing DNR)  DVT prophylaxis:PREM  Stress Ulcer prophylaxis: Protonix IV  Bladder catheter:no  Family Contact Info:  Primary Emergency ContactEvDarnell Coy Phone: [phone]  Bedded: [facility] Room 200/01  Disposition: TBD, likely home when stable  Admission status:  Observation    -Tentative plan of care discussed with patient / family, who demonstrated understanding and is in agreement to the above  -Case was reviewed with the ED Provider, MD Keith Cummings MD  [facility] Hospitalist  [phone]

## 2021-08-30 NOTE — PROGRESS NOTES
Bladder scan revealed over 300 ml of urine in patient's bladder. Pt unable to void in urinal. No urine output in ED or since admission. Attempted to cath patient without success. Nsg sup made aware.

## 2021-08-30 NOTE — PROGRESS NOTES
TRANSFER - IN REPORT:    Verbal report received from MARY Jensen RN(name) on Hi Quiles  being received from ED(unit) for routine progression of care      Report consisted of patients Situation, Background, Assessment and   Recommendations(SBAR). Information from the following report(s) ED Summary was reviewed with the receiving nurse. Opportunity for questions and clarification was provided. Assessment completed upon patients arrival to unit and care assumed.

## 2021-08-30 NOTE — PROGRESS NOTES
Received call from Jey Martinez in lab regarding patient's critical lactic acid, unable to contact hospitalist on call today. LA 3.4, Mallory Welsh aware, to contact Dr Maycol Melo.

## 2021-08-30 NOTE — Clinical Note
{[facility] BEDSIDE_VERBAL_RECORDED_WRITTEN:66111::\"Bedside\"} shift change report given to *** (oncoming nurse) by *** (offgoing nurse). Report included the following information {SBAR REPORTS LTFT:04213}.

## 2021-08-31 PROBLEM — E87.20 METABOLIC ACIDOSIS: Status: ACTIVE | Noted: 2021-01-01

## 2021-08-31 NOTE — PROGRESS NOTES
Bedside shift change report given to SCOTTY Dill (oncoming nurse) by EDILIA Camejo (offgoing nurse). Report included the following information SBAR and Kardex.

## 2021-08-31 NOTE — ROUTINE PROCESS
Mr [facility] contacted writer. He stated he would arrive in approximately 10 min.

## 2021-08-31 NOTE — DISCHARGE SUMMARY
Death Note      Patient pronounced w/o pulse or respiration at 1515    When seen earlier he was at baseline with yesterday  Labs were noted for HyperNa and persistent Met Acidosis  IV fluids changed to D5W with NaHC03   R leg remained cool to tough  Pt did seem to be tender in the abd as well  Daughter also felt he held a had over his abd  Exam noted 1 finger tenderness and diminished bowel sounds  About 2:30 he suddenly became unresponsive, gaze to R, snoring tachypnea, flacid extremities  Suspected a CNS event  CT was discussed and offered and family deferred (was not going to change the plan of treatment)  Then suddenly became quiet, HR dropped on tele  My exam was noted for no heart sounds, no pulse, no respirations  Pt pronounced deseased at 1515  Remains to  home pending family coming in to view body    Cause of Death:  Ischemia R LE due to PVD  Mesenteric Ischemia  Persistent Metabolic Acidosis  Sudden COMA - Possible CNS bleed    D/c summ to follow

## 2021-08-31 NOTE — PROGRESS NOTES
Responded to death of patient in Rm 200. Consulted with doctor. Provided grief support and spiritual presence  Advised of  Availability. Chaplains will follow as able and/or needed.   [address] [place] [address]

## 2021-08-31 NOTE — PROGRESS NOTES
DNR discussed with daughter yesterday  She has confirmed with her siblings  DNR signed  Order written

## 2021-08-31 NOTE — PROGRESS NOTES
Follow up visit  with patient in Rm 200  Provided empathic listening and spiritual support  [address] [place] [address]

## 2021-08-31 NOTE — PROGRESS NOTES
Isra Fisher RN called requesting tylenol supp for pt since moaning increases, pt is not dysphoretic, not agitated and denies pain.   Pt unable to follow commands to assess arm strength - pt remains laying of left side w/eyes closed

## 2021-08-31 NOTE — PROGRESS NOTES
Dr. Joshua Lam advises that he wants patient to wake up and become more alert - pt's daughter is at bedside talking w/pt which pt is able to carry on small conversation - pt asking for cigarette, Dr. Joshua Lam declined nicotine patch and ativan because pt was not alert earlier.

## 2021-08-31 NOTE — ROUTINE PROCESS
Writer notified Kam Arana spoke with Silvina Mills. Demographics provided as requested (name, , MRN, admit date, race, Dx,  Culture results). She states not medically suitable for eye or organ donation. Release deferred until notified by eye bank.

## 2021-08-31 NOTE — PROGRESS NOTES
Pt with labored breathing,resp. 28,not responding to voice like he was earlier. Has a set gaze,not responding to stimuli. Dr Vasile Powell called and in room to see pt. Pt's daughter at bedside.

## 2021-08-31 NOTE — ROUTINE PROCESS
Writer contacted [employer]. [facility] & spoke to Andrea Richardson. She will have director return call. Demographics provided as requested( name, time of death, emergency contact #).

## 2021-08-31 NOTE — ROUTINE PROCESS
Kristin White from [facility] called writer to notify she is  releasing the body- may release to the [facility] now.

## 2021-08-31 NOTE — PROGRESS NOTES
Problem: Falls - Risk of  Goal: *Absence of Falls  Description: Document Mikayla Rodriguez Fall Risk and appropriate interventions in the flowsheet. Outcome: Progressing Towards Goal  Note: Fall Risk Interventions:       Mentation Interventions: Bed/chair exit alarm, Door open when patient unattended    Medication Interventions: Bed/chair exit alarm    Elimination Interventions: Bed/chair exit alarm, Call light in reach, Toileting schedule/hourly rounds    History of Falls Interventions: Bed/chair exit alarm, Door open when patient unattended         Problem: General Medical Care Plan  Goal: *Skin integrity maintained  Outcome: Progressing Towards Goal     Problem: Hypertension  Goal: *Blood pressure within specified parameters  Outcome: Progressing Towards Goal     Problem: Pressure Injury - Risk of  Goal: *Prevention of pressure injury  Description: Document Milo Scale and appropriate interventions in the flowsheet.   Outcome: Progressing Towards Goal  Note: Pressure Injury Interventions:  Sensory Interventions: Keep linens dry and wrinkle-free    Moisture Interventions: Absorbent underpads    Activity Interventions: Assess need for specialty bed    Mobility Interventions: HOB 30 degrees or less    Nutrition Interventions: Document food/fluid/supplement intake    Friction and Shear Interventions: Apply protective barrier, creams and emollients

## 2021-09-06 LAB
BACTERIA SPEC CULT: NORMAL
BACTERIA SPEC CULT: NORMAL
SERVICE CMNT-IMP: NORMAL
SERVICE CMNT-IMP: NORMAL

## 2024-01-01 NOTE — PROGRESS NOTES
Daughter remains at bedside and very supportive, pts mouth moistened with a few ice chips, pt resists some mouth care with swabs, poor dentation, foul breath. Uterotonics